# Patient Record
Sex: MALE | Race: BLACK OR AFRICAN AMERICAN | NOT HISPANIC OR LATINO | Employment: FULL TIME | URBAN - METROPOLITAN AREA
[De-identification: names, ages, dates, MRNs, and addresses within clinical notes are randomized per-mention and may not be internally consistent; named-entity substitution may affect disease eponyms.]

---

## 2018-04-05 ENCOUNTER — OFFICE VISIT (OUTPATIENT)
Dept: FAMILY MEDICINE CLINIC | Facility: CLINIC | Age: 58
End: 2018-04-05

## 2018-04-05 VITALS
TEMPERATURE: 98.3 F | WEIGHT: 237 LBS | OXYGEN SATURATION: 98 % | HEIGHT: 74 IN | BODY MASS INDEX: 30.42 KG/M2 | SYSTOLIC BLOOD PRESSURE: 132 MMHG | HEART RATE: 73 BPM | DIASTOLIC BLOOD PRESSURE: 84 MMHG

## 2018-04-05 DIAGNOSIS — H11.003 PTERYGIUM EYE, BILATERAL: ICD-10-CM

## 2018-04-05 DIAGNOSIS — H52.4 PRESBYOPIA OF BOTH EYES: Primary | ICD-10-CM

## 2018-04-05 DIAGNOSIS — H53.40 VISUAL FIELD DEFECT: ICD-10-CM

## 2018-04-05 PROCEDURE — 99213 OFFICE O/P EST LOW 20 MIN: CPT | Performed by: FAMILY MEDICINE

## 2018-04-05 RX ORDER — AMLODIPINE BESYLATE 10 MG/1
TABLET ORAL
COMMUNITY
Start: 2018-02-20 | End: 2019-02-05 | Stop reason: SDUPTHER

## 2018-04-05 RX ORDER — LISINOPRIL AND HYDROCHLOROTHIAZIDE 25; 20 MG/1; MG/1
TABLET ORAL
COMMUNITY
Start: 2018-02-20 | End: 2019-02-05 | Stop reason: SDUPTHER

## 2018-04-05 NOTE — PROGRESS NOTES
"Subjective   Aron Driscoll is a 57 y.o. male.     Eye Problem    Both eyes are affected.The current episode started more than 1 month ago. The problem has been gradually worsening. There was no injury mechanism. Associated symptoms include blurred vision (left eye) and eye redness. He has tried nothing for the symptoms.        The following portions of the patient's history were reviewed and updated as appropriate: allergies, current medications, past social history and problem list.    Review of Systems   Eyes: Positive for blurred vision (left eye), redness and visual disturbance.        Presbyopia requiring readers.       Objective   /84 (BP Location: Right arm, Patient Position: Sitting, Cuff Size: Adult)   Pulse 73   Temp 98.3 °F (36.8 °C) (Oral)   Ht 188 cm (74\")   Wt 108 kg (237 lb)   SpO2 98%   BMI 30.43 kg/m²   Physical Exam   Constitutional: He appears well-developed and well-nourished.   HENT:   Mouth/Throat: Oropharynx is clear and moist.   Eyes:   The patient appears to have pterygia developing  at the medial aspect of each eye.  On the right side this is more prominent and does enter into the outer aspect of the iris.  On the left side it is less developed.   Nursing note and vitals reviewed.      Assessment/Plan   Problem List Items Addressed This Visit     None      Visit Diagnoses     Presbyopia of both eyes    -  Primary    Visual field defect        left eye      Pterygium eye, bilateral                  Drink plenty fluids.    Continue medications as doing.    Refer to Ophthalmology Dr Abbott.    Follow up as needed.                Scribed for Dr Gurpreet Mackenzie by Shaina Brock CMA.          I, Gurpreet Mackenzie MD, personally performed the services described in this documentation, as scribed by Shaina Brock in my presence, and is both accurate and complete.      "

## 2019-02-05 ENCOUNTER — OFFICE VISIT (OUTPATIENT)
Dept: FAMILY MEDICINE CLINIC | Facility: CLINIC | Age: 59
End: 2019-02-05

## 2019-02-05 VITALS
OXYGEN SATURATION: 98 % | WEIGHT: 226.4 LBS | SYSTOLIC BLOOD PRESSURE: 118 MMHG | TEMPERATURE: 97.9 F | HEIGHT: 76 IN | HEART RATE: 66 BPM | RESPIRATION RATE: 16 BRPM | BODY MASS INDEX: 27.57 KG/M2 | DIASTOLIC BLOOD PRESSURE: 82 MMHG

## 2019-02-05 DIAGNOSIS — N52.02 CORPORO-VENOUS OCCLUSIVE ERECTILE DYSFUNCTION: ICD-10-CM

## 2019-02-05 DIAGNOSIS — Z00.00 HEALTHCARE MAINTENANCE: ICD-10-CM

## 2019-02-05 DIAGNOSIS — I10 ESSENTIAL HYPERTENSION: ICD-10-CM

## 2019-02-05 LAB
ALBUMIN SERPL-MCNC: 4.66 G/DL (ref 3.2–4.8)
ALBUMIN/GLOB SERPL: 2.1 G/DL (ref 1.5–2.5)
ALP SERPL-CCNC: 53 U/L (ref 25–100)
ALT SERPL W P-5'-P-CCNC: 20 U/L (ref 7–40)
ANION GAP SERPL CALCULATED.3IONS-SCNC: 4 MMOL/L (ref 3–11)
ARTICHOKE IGE QN: 136 MG/DL (ref 0–130)
AST SERPL-CCNC: 21 U/L (ref 0–33)
BASOPHILS # BLD AUTO: 0.02 10*3/MM3 (ref 0–0.2)
BASOPHILS NFR BLD AUTO: 0.5 % (ref 0–1)
BILIRUB BLD-MCNC: NEGATIVE MG/DL
BILIRUB SERPL-MCNC: 0.6 MG/DL (ref 0.3–1.2)
BUN BLD-MCNC: 22 MG/DL (ref 9–23)
BUN/CREAT SERPL: 15.7 (ref 7–25)
CALCIUM SPEC-SCNC: 9.7 MG/DL (ref 8.7–10.4)
CHLORIDE SERPL-SCNC: 104 MMOL/L (ref 99–109)
CHOLEST SERPL-MCNC: 177 MG/DL (ref 0–200)
CLARITY, POC: CLEAR
CO2 SERPL-SCNC: 32 MMOL/L (ref 20–31)
COLOR UR: YELLOW
CREAT BLD-MCNC: 1.4 MG/DL (ref 0.6–1.3)
CRP SERPL-MCNC: 0.04 MG/DL (ref 0–1)
DEPRECATED RDW RBC AUTO: 44.5 FL (ref 37–54)
EOSINOPHIL # BLD AUTO: 0.06 10*3/MM3 (ref 0–0.3)
EOSINOPHIL NFR BLD AUTO: 1.5 % (ref 0–3)
ERYTHROCYTE [DISTWIDTH] IN BLOOD BY AUTOMATED COUNT: 14 % (ref 11.3–14.5)
GFR SERPL CREATININE-BSD FRML MDRD: 63 ML/MIN/1.73
GLOBULIN UR ELPH-MCNC: 2.2 GM/DL
GLUCOSE BLD-MCNC: 78 MG/DL (ref 70–100)
GLUCOSE UR STRIP-MCNC: NEGATIVE MG/DL
HBA1C MFR BLD: 5.4 % (ref 4.8–5.6)
HCT VFR BLD AUTO: 44.3 % (ref 38.9–50.9)
HCV AB SER DONR QL: NORMAL
HDLC SERPL-MCNC: 31 MG/DL (ref 40–60)
HGB BLD-MCNC: 14.3 G/DL (ref 13.1–17.5)
HIV1+2 AB SER QL: NORMAL
IMM GRANULOCYTES # BLD AUTO: 0 10*3/MM3 (ref 0–0.03)
IMM GRANULOCYTES NFR BLD AUTO: 0 % (ref 0–0.6)
KETONES UR QL: NEGATIVE
LEUKOCYTE EST, POC: NEGATIVE
LYMPHOCYTES # BLD AUTO: 1.92 10*3/MM3 (ref 0.6–4.8)
LYMPHOCYTES NFR BLD AUTO: 46.7 % (ref 24–44)
MCH RBC QN AUTO: 27.9 PG (ref 27–31)
MCHC RBC AUTO-ENTMCNC: 32.3 G/DL (ref 32–36)
MCV RBC AUTO: 86.5 FL (ref 80–99)
MONOCYTES # BLD AUTO: 0.35 10*3/MM3 (ref 0–1)
MONOCYTES NFR BLD AUTO: 8.5 % (ref 0–12)
NEUTROPHILS # BLD AUTO: 1.76 10*3/MM3 (ref 1.5–8.3)
NEUTROPHILS NFR BLD AUTO: 42.8 % (ref 41–71)
NITRITE UR-MCNC: NEGATIVE MG/ML
PH UR: 6 [PH] (ref 5–8)
PLATELET # BLD AUTO: 227 10*3/MM3 (ref 150–450)
PMV BLD AUTO: 10 FL (ref 6–12)
POTASSIUM BLD-SCNC: 4.2 MMOL/L (ref 3.5–5.5)
PROT SERPL-MCNC: 6.9 G/DL (ref 5.7–8.2)
PROT UR STRIP-MCNC: NEGATIVE MG/DL
PSA SERPL-MCNC: 3.88 NG/ML (ref 0–4)
RBC # BLD AUTO: 5.12 10*6/MM3 (ref 4.2–5.76)
RBC # UR STRIP: NEGATIVE /UL
SODIUM BLD-SCNC: 140 MMOL/L (ref 132–146)
SP GR UR: 1.02 (ref 1–1.03)
TRIGL SERPL-MCNC: 98 MG/DL (ref 0–150)
TSH SERPL DL<=0.05 MIU/L-ACNC: 1.76 MIU/ML (ref 0.35–5.35)
URATE SERPL-MCNC: 8 MG/DL (ref 3.7–9.2)
UROBILINOGEN UR QL: NORMAL
WBC NRBC COR # BLD: 4.11 10*3/MM3 (ref 3.5–10.8)

## 2019-02-05 PROCEDURE — 83036 HEMOGLOBIN GLYCOSYLATED A1C: CPT | Performed by: FAMILY MEDICINE

## 2019-02-05 PROCEDURE — 99396 PREV VISIT EST AGE 40-64: CPT | Performed by: FAMILY MEDICINE

## 2019-02-05 PROCEDURE — 93000 ELECTROCARDIOGRAM COMPLETE: CPT | Performed by: FAMILY MEDICINE

## 2019-02-05 PROCEDURE — G0432 EIA HIV-1/HIV-2 SCREEN: HCPCS | Performed by: FAMILY MEDICINE

## 2019-02-05 PROCEDURE — 86140 C-REACTIVE PROTEIN: CPT | Performed by: FAMILY MEDICINE

## 2019-02-05 PROCEDURE — 84443 ASSAY THYROID STIM HORMONE: CPT | Performed by: FAMILY MEDICINE

## 2019-02-05 PROCEDURE — 84550 ASSAY OF BLOOD/URIC ACID: CPT | Performed by: FAMILY MEDICINE

## 2019-02-05 PROCEDURE — 90471 IMMUNIZATION ADMIN: CPT | Performed by: FAMILY MEDICINE

## 2019-02-05 PROCEDURE — 90715 TDAP VACCINE 7 YRS/> IM: CPT | Performed by: FAMILY MEDICINE

## 2019-02-05 PROCEDURE — 80061 LIPID PANEL: CPT | Performed by: FAMILY MEDICINE

## 2019-02-05 PROCEDURE — 86803 HEPATITIS C AB TEST: CPT | Performed by: FAMILY MEDICINE

## 2019-02-05 PROCEDURE — G0103 PSA SCREENING: HCPCS | Performed by: FAMILY MEDICINE

## 2019-02-05 PROCEDURE — 85025 COMPLETE CBC W/AUTO DIFF WBC: CPT | Performed by: FAMILY MEDICINE

## 2019-02-05 PROCEDURE — 80053 COMPREHEN METABOLIC PANEL: CPT | Performed by: FAMILY MEDICINE

## 2019-02-05 RX ORDER — SILDENAFIL 100 MG/1
100 TABLET, FILM COATED ORAL DAILY PRN
Qty: 9 TABLET | Refills: 11 | Status: SHIPPED | OUTPATIENT
Start: 2019-02-05 | End: 2020-02-13 | Stop reason: SDUPTHER

## 2019-02-05 RX ORDER — LISINOPRIL AND HYDROCHLOROTHIAZIDE 25; 20 MG/1; MG/1
1 TABLET ORAL DAILY
Qty: 90 TABLET | Refills: 3 | Status: SHIPPED | OUTPATIENT
Start: 2019-02-05 | End: 2019-02-21 | Stop reason: SDUPTHER

## 2019-02-05 RX ORDER — LATANOPROST 50 UG/ML
1 SOLUTION/ DROPS OPHTHALMIC
Refills: 0 | COMMUNITY
Start: 2019-01-14 | End: 2021-03-10 | Stop reason: SDUPTHER

## 2019-02-05 RX ORDER — DORZOLAMIDE HYDROCHLORIDE AND TIMOLOL MALEATE 20; 5 MG/ML; MG/ML
SOLUTION/ DROPS OPHTHALMIC
Refills: 1 | COMMUNITY
Start: 2018-11-21 | End: 2021-03-10 | Stop reason: SDUPTHER

## 2019-02-05 RX ORDER — AMLODIPINE BESYLATE 10 MG/1
10 TABLET ORAL DAILY
Qty: 90 TABLET | Refills: 3 | Status: SHIPPED | OUTPATIENT
Start: 2019-02-05 | End: 2019-02-21 | Stop reason: SDUPTHER

## 2019-02-05 NOTE — PROGRESS NOTES
Subjective   Aron Driscoll is a 58 y.o. male.     History of Present Illness   He is here for his annual fasting wellness evaluation.  He is amendable to updating his immunizations.  He is needing a refill of his blood pressure medications.  He is tolerating them well with no adverse events.  He describes good blood pressure control.  He is also requesting a refill of his phosphodiesterase inhibitor.    Review of Systems   Constitutional: Negative.    HENT: Negative.    Eyes: Negative.    Respiratory: Negative.    Cardiovascular: Negative.    Gastrointestinal: Negative.    Endocrine: Negative.    Genitourinary: Negative.    Musculoskeletal: Negative.    Skin: Negative.    Allergic/Immunologic: Negative.    Neurological: Negative.    Hematological: Negative.    Psychiatric/Behavioral: Negative.        Objective   Physical Exam   Constitutional: He is oriented to person, place, and time. He appears well-developed and well-nourished. He is cooperative.   HENT:   Head: Normocephalic and atraumatic.   Right Ear: Hearing and external ear normal.   Left Ear: Hearing and external ear normal.   Nose: Nose normal.   Mouth/Throat: Uvula is midline, oropharynx is clear and moist and mucous membranes are normal.   Eyes: Conjunctivae and EOM are normal. Pupils are equal, round, and reactive to light. No scleral icterus.   Neck: Trachea normal and normal range of motion. Neck supple. No JVD present. Carotid bruit is not present. No thyromegaly present.   Cardiovascular: Normal rate, regular rhythm, normal heart sounds and intact distal pulses.   Pulmonary/Chest: Effort normal and breath sounds normal.   Abdominal: Soft. Bowel sounds are normal. There is no hepatosplenomegaly. There is no tenderness.   Musculoskeletal: Normal range of motion.   Lymphadenopathy:     He has no cervical adenopathy.   Neurological: He is alert and oriented to person, place, and time. He has normal strength and normal reflexes. No sensory deficit. Gait  normal.   Skin: Skin is warm and dry.   Psychiatric: He has a normal mood and affect. His speech is normal and behavior is normal. Judgment and thought content normal. Cognition and memory are normal.   Nursing note and vitals reviewed.      ECG 12 Lead  Date/Time: 2/5/2019 8:43 AM  Performed by: Juan Alberto Ponce MD  Authorized by: Juan Alberto Ponce MD   Comparison: not compared with previous ECG   Previous ECG: no previous ECG available  Rhythm: sinus rhythm  Rate: normal  BPM: 62  Conduction: 1st degree  ST Segments: ST segments normal  T Waves: T waves normal  QRS axis: normal  Clinical impression: abnormal ECG            Assessment/Plan   Diagnoses and all orders for this visit:    Healthcare maintenance  -     POC Urinalysis Dipstick, Automated  -     Comprehensive Metabolic Panel  -     CBC & Differential  -     Lipid Panel  -     TSH  -     Uric Acid  -     C-reactive Protein  -     PSA Screen  -     HIV-1 / O / 2 Ag / Antibody 4th Generation  -     Hepatitis C Antibody  -     Hemoglobin A1c  -     Tdap Vaccine IM  - Vaccine counseling and current VIS is provided for immunizations administered today.    Essential hypertension  -     ECG 12 Lead  -     POC Urinalysis Dipstick, Automated  -     Comprehensive Metabolic Panel  -     lisinopril-hydrochlorothiazide (PRINZIDE,ZESTORETIC) 20-25 MG per tablet; Take 1 tablet by mouth Daily.  -     amLODIPine (NORVASC) 10 MG tablet; Take 1 tablet by mouth Daily.  - Healthy heart diet  - Daily aerobic exercise  - Routine blood pressure monitoring  - [place] Heart Disease and Stroke Prevention Task Force pamphlet reviewed and administered.    Corporo-venous occlusive erectile dysfunction  -     sildenafil (VIAGRA) 100 MG tablet; Take 1 tablet by mouth Daily As Needed for erectile dysfunction.  - Avoid excessive alcohol  - Sleep / wake hygiene  - Daily aerobic exercise    The patient is here for a health maintenance visit.  Currently, the patient consumes a healthy diet and  has an adequate exercise regimen. Screening lab work is ordered.  Immunizations are updated today.  Advice and education is given regarding nutrition, aerobic exercise, routine dental evaluations, routine eye exams, reproductive health, cardiovascular risk reduction, sunscreen use, self skin examination (annual dermatology evaluations) and seat belt use (general overall safety).  Further recommendations after lab evaluation.  Annual wellness evaluations recommended.

## 2019-02-19 ENCOUNTER — TELEPHONE (OUTPATIENT)
Dept: FAMILY MEDICINE CLINIC | Facility: CLINIC | Age: 59
End: 2019-02-19
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
## 2019-02-21 DIAGNOSIS — I10 ESSENTIAL HYPERTENSION: ICD-10-CM

## 2019-02-21 RX ORDER — LISINOPRIL AND HYDROCHLOROTHIAZIDE 25; 20 MG/1; MG/1
1 TABLET ORAL DAILY
Qty: 90 TABLET | Refills: 0 | Status: SHIPPED | OUTPATIENT
Start: 2019-02-21 | End: 2019-05-24 | Stop reason: SDUPTHER

## 2019-02-21 RX ORDER — AMLODIPINE BESYLATE 10 MG/1
10 TABLET ORAL DAILY
Qty: 90 TABLET | Refills: 0 | Status: SHIPPED | OUTPATIENT
Start: 2019-02-21 | End: 2019-05-24 | Stop reason: SDUPTHER

## 2019-05-24 ENCOUNTER — TELEPHONE (OUTPATIENT)
Dept: FAMILY MEDICINE CLINIC | Facility: CLINIC | Age: 59
End: 2019-05-24

## 2019-05-24 DIAGNOSIS — I10 ESSENTIAL HYPERTENSION: ICD-10-CM

## 2019-05-24 RX ORDER — AMLODIPINE BESYLATE 10 MG/1
10 TABLET ORAL DAILY
Qty: 90 TABLET | Refills: 3 | Status: SHIPPED | OUTPATIENT
Start: 2019-05-24 | End: 2020-02-13 | Stop reason: SDUPTHER

## 2019-05-24 RX ORDER — LISINOPRIL AND HYDROCHLOROTHIAZIDE 25; 20 MG/1; MG/1
1 TABLET ORAL DAILY
Qty: 90 TABLET | Refills: 3 | Status: SHIPPED | OUTPATIENT
Start: 2019-05-24 | End: 2020-02-13 | Stop reason: SDUPTHER

## 2019-05-24 NOTE — TELEPHONE ENCOUNTER
----- Message from Randell Plummer sent at 5/24/2019 10:08 AM EDT -----  Regarding: LISENOPRIL AND AMLODINE  Contact: [phone]  PT NEEDS REFILLS ON ABOVE MEDICATIONS

## 2019-05-28 ENCOUNTER — OFFICE VISIT (OUTPATIENT)
Dept: FAMILY MEDICINE CLINIC | Facility: CLINIC | Age: 59
End: 2019-05-28

## 2019-05-28 VITALS
SYSTOLIC BLOOD PRESSURE: 120 MMHG | DIASTOLIC BLOOD PRESSURE: 76 MMHG | RESPIRATION RATE: 16 BRPM | HEART RATE: 70 BPM | BODY MASS INDEX: 27.84 KG/M2 | WEIGHT: 228.6 LBS | OXYGEN SATURATION: 98 % | HEIGHT: 76 IN

## 2019-05-28 DIAGNOSIS — N18.30 CKD (CHRONIC KIDNEY DISEASE) STAGE 3, GFR 30-59 ML/MIN (HCC): Primary | ICD-10-CM

## 2019-05-28 DIAGNOSIS — I10 ESSENTIAL HYPERTENSION: ICD-10-CM

## 2019-05-28 DIAGNOSIS — E78.01 FAMILIAL HYPERCHOLESTEROLEMIA: ICD-10-CM

## 2019-05-28 LAB
ANION GAP SERPL CALCULATED.3IONS-SCNC: 12.3 MMOL/L
BUN BLD-MCNC: 18 MG/DL (ref 6–20)
BUN/CREAT SERPL: 14.3 (ref 7–25)
CALCIUM SPEC-SCNC: 9.3 MG/DL (ref 8.6–10.5)
CHLORIDE SERPL-SCNC: 101 MMOL/L (ref 98–107)
CO2 SERPL-SCNC: 27.7 MMOL/L (ref 22–29)
CREAT BLD-MCNC: 1.26 MG/DL (ref 0.76–1.27)
GFR SERPL CREATININE-BSD FRML MDRD: 71 ML/MIN/1.73
GLUCOSE BLD-MCNC: 76 MG/DL (ref 65–99)
POTASSIUM BLD-SCNC: 4.1 MMOL/L (ref 3.5–5.2)
SODIUM BLD-SCNC: 141 MMOL/L (ref 136–145)

## 2019-05-28 PROCEDURE — 99214 OFFICE O/P EST MOD 30 MIN: CPT | Performed by: FAMILY MEDICINE

## 2019-05-28 PROCEDURE — 80048 BASIC METABOLIC PNL TOTAL CA: CPT | Performed by: FAMILY MEDICINE

## 2019-05-28 RX ORDER — ROSUVASTATIN CALCIUM 20 MG/1
20 TABLET, COATED ORAL DAILY
Qty: 90 TABLET | Refills: 3 | Status: SHIPPED | OUTPATIENT
Start: 2019-05-28 | End: 2019-05-30 | Stop reason: SDUPTHER

## 2019-05-28 NOTE — PROGRESS NOTES
"Max Driscoll is a 58 y.o. male.     History of Present Illness   He is here to follow up on his elevated creatinine (CKD3), HLD and HTN.  He reports tolerating all of his medications well with no adverse events.  He describes improved blood pressure control.  He has instituted a low cholesterol diet.  He reports routine aerobic activity.  He describes avoidance of routine NSAID use and increased daily hydration.  He voices no acute symptoms.  He is anticipating repeat lab evaluation.    Medical History  I have reviewed and updated the following portions of the patient's history: allergies, current medications, past medical history, past surgical history, past family history, and past social history.    Review of Systems   Constitutional: Negative for chills and fever.   Respiratory: Negative for shortness of breath.    Cardiovascular: Negative for chest pain and leg swelling.   Genitourinary: Negative for difficulty urinating, dysuria, flank pain and hematuria.   Hematological: Does not bruise/bleed easily.       Objective   /76   Pulse 70   Resp 16   Ht 191.8 cm (75.5\")   Wt 104 kg (228 lb 9.6 oz)   SpO2 98%   BMI 28.20 kg/m²     Physical Exam   Constitutional: He is oriented to person, place, and time. He appears well-developed and well-nourished.   HENT:   Head: Normocephalic.   Mouth/Throat: Oropharynx is clear and moist.   Eyes: Conjunctivae and EOM are normal. Pupils are equal, round, and reactive to light.   Neck: Neck supple. No thyromegaly present.   Cardiovascular: Normal rate, regular rhythm and normal heart sounds.   Pulmonary/Chest: Effort normal and breath sounds normal.   Abdominal: Soft. Bowel sounds are normal. There is no tenderness. There is no CVA tenderness.   Musculoskeletal: Normal range of motion.   Neurological: He is alert and oriented to person, place, and time. Coordination normal.   Skin: Skin is warm and dry. Capillary refill takes less than 2 seconds. "   Psychiatric: He has a normal mood and affect. His behavior is normal.   Nursing note and vitals reviewed.      Assessment/Plan   Diagnoses and all orders for this visit:    CKD (chronic kidney disease) stage 3, GFR 30-59 ml/min (CMS/[facility])  -     Basic Metabolic Panel  -     US Renal Bilateral ordered  -     Ambulatory Referral to Nephrology  - We discussed his chronic history of hypertension  - We discussed the importance of good blood pressure control  - Avoid nephrotoxic medications including routine use of NSAIDS  - Hydrate well daily    Familial hypercholesterolemia  -     rosuvastatin (CRESTOR) 20 MG tablet; Take 1 tablet by mouth Daily. #90 c 3RF  - Low cholesterol diet (< 200 mg / day)  - Daily aerobic exercise  - Aspirin 81 mg po once daily    Essential hypertension        - Amlodipine 10 mg po once daily        - Lisinopril/HCTZ 20-25 mg po once daily  - Healthy heart diet  - Low dose aspirin once daily  - Daily aerobic exercise  - Routine blood pressure monitoring  - [place] Heart Disease and Stroke Prevention Task Force pamphlet reviewed and administered.    RTC after nephrology evaluation

## 2019-05-30 DIAGNOSIS — E78.01 FAMILIAL HYPERCHOLESTEROLEMIA: ICD-10-CM

## 2019-05-30 RX ORDER — ROSUVASTATIN CALCIUM 20 MG/1
20 TABLET, COATED ORAL DAILY
Qty: 30 TABLET | Refills: 11 | Status: SHIPPED | OUTPATIENT
Start: 2019-05-30 | End: 2019-06-03 | Stop reason: ALTCHOICE

## 2019-06-03 ENCOUNTER — HOSPITAL ENCOUNTER (OUTPATIENT)
Dept: ULTRASOUND IMAGING | Facility: HOSPITAL | Age: 59
Discharge: HOME | End: 2019-06-03
Admitting: FAMILY MEDICINE

## 2019-06-03 DIAGNOSIS — E78.01 FAMILIAL HYPERCHOLESTEROLEMIA: Primary | ICD-10-CM

## 2019-06-03 DIAGNOSIS — N18.30 CKD (CHRONIC KIDNEY DISEASE) STAGE 3, GFR 30-59 ML/MIN (HCC): ICD-10-CM

## 2019-06-03 PROCEDURE — 76775 US EXAM ABDO BACK WALL LIM: CPT

## 2019-06-03 RX ORDER — ATORVASTATIN CALCIUM 20 MG/1
20 TABLET, FILM COATED ORAL DAILY
Qty: 90 TABLET | Refills: 2 | Status: SHIPPED | OUTPATIENT
Start: 2019-06-03 | End: 2020-02-13 | Stop reason: SDUPTHER

## 2020-02-13 ENCOUNTER — OFFICE VISIT (OUTPATIENT)
Dept: FAMILY MEDICINE CLINIC | Facility: CLINIC | Age: 60
End: 2020-02-13

## 2020-02-13 VITALS
SYSTOLIC BLOOD PRESSURE: 112 MMHG | OXYGEN SATURATION: 97 % | DIASTOLIC BLOOD PRESSURE: 72 MMHG | HEART RATE: 70 BPM | RESPIRATION RATE: 18 BRPM | BODY MASS INDEX: 27.28 KG/M2 | TEMPERATURE: 97.3 F | WEIGHT: 224 LBS | HEIGHT: 76 IN

## 2020-02-13 DIAGNOSIS — E78.01 FAMILIAL HYPERCHOLESTEROLEMIA: ICD-10-CM

## 2020-02-13 DIAGNOSIS — R94.31 ABNORMAL ECG: ICD-10-CM

## 2020-02-13 DIAGNOSIS — N18.30 CKD (CHRONIC KIDNEY DISEASE) STAGE 3, GFR 30-59 ML/MIN (HCC): ICD-10-CM

## 2020-02-13 DIAGNOSIS — I10 ESSENTIAL HYPERTENSION: ICD-10-CM

## 2020-02-13 DIAGNOSIS — Z12.5 SCREENING FOR MALIGNANT NEOPLASM OF PROSTATE: ICD-10-CM

## 2020-02-13 DIAGNOSIS — Z00.00 ANNUAL PHYSICAL EXAM: Primary | ICD-10-CM

## 2020-02-13 DIAGNOSIS — Z23 NEED FOR INFLUENZA VACCINATION: ICD-10-CM

## 2020-02-13 DIAGNOSIS — N52.02 CORPORO-VENOUS OCCLUSIVE ERECTILE DYSFUNCTION: ICD-10-CM

## 2020-02-13 PROBLEM — E78.5 HLD (HYPERLIPIDEMIA): Status: ACTIVE | Noted: 2020-02-13

## 2020-02-13 PROBLEM — H40.9 GLAUCOMA: Status: ACTIVE | Noted: 2020-02-13

## 2020-02-13 LAB
ALBUMIN SERPL-MCNC: 4.5 G/DL (ref 3.5–5.2)
ALBUMIN/GLOB SERPL: 1.7 G/DL
ALP SERPL-CCNC: 60 U/L (ref 39–117)
ALT SERPL W P-5'-P-CCNC: 20 U/L (ref 1–41)
ANION GAP SERPL CALCULATED.3IONS-SCNC: 10.7 MMOL/L (ref 5–15)
AST SERPL-CCNC: 17 U/L (ref 1–40)
BASOPHILS # BLD AUTO: 0.03 10*3/MM3 (ref 0–0.2)
BASOPHILS NFR BLD AUTO: 0.8 % (ref 0–1.5)
BILIRUB BLD-MCNC: NEGATIVE MG/DL
BILIRUB SERPL-MCNC: 0.3 MG/DL (ref 0.2–1.2)
BUN BLD-MCNC: 15 MG/DL (ref 6–20)
BUN/CREAT SERPL: 12.3 (ref 7–25)
CALCIUM SPEC-SCNC: 9.6 MG/DL (ref 8.6–10.5)
CHLORIDE SERPL-SCNC: 98 MMOL/L (ref 98–107)
CHOLEST SERPL-MCNC: 112 MG/DL (ref 0–200)
CLARITY, POC: CLEAR
CO2 SERPL-SCNC: 29.3 MMOL/L (ref 22–29)
COLOR UR: YELLOW
CREAT BLD-MCNC: 1.22 MG/DL (ref 0.76–1.27)
DEPRECATED RDW RBC AUTO: 42.3 FL (ref 37–54)
EOSINOPHIL # BLD AUTO: 0.07 10*3/MM3 (ref 0–0.4)
EOSINOPHIL NFR BLD AUTO: 1.8 % (ref 0.3–6.2)
ERYTHROCYTE [DISTWIDTH] IN BLOOD BY AUTOMATED COUNT: 13.2 % (ref 12.3–15.4)
GFR SERPL CREATININE-BSD FRML MDRD: 74 ML/MIN/1.73
GLOBULIN UR ELPH-MCNC: 2.7 GM/DL
GLUCOSE BLD-MCNC: 86 MG/DL (ref 65–99)
GLUCOSE UR STRIP-MCNC: NEGATIVE MG/DL
HCT VFR BLD AUTO: 44.6 % (ref 37.5–51)
HDLC SERPL-MCNC: 31 MG/DL (ref 40–60)
HGB BLD-MCNC: 14.5 G/DL (ref 13–17.7)
IMM GRANULOCYTES # BLD AUTO: 0.01 10*3/MM3 (ref 0–0.05)
IMM GRANULOCYTES NFR BLD AUTO: 0.3 % (ref 0–0.5)
KETONES UR QL: NEGATIVE
LDLC SERPL CALC-MCNC: 69 MG/DL (ref 0–100)
LDLC/HDLC SERPL: 2.22 {RATIO}
LEUKOCYTE EST, POC: NEGATIVE
LYMPHOCYTES # BLD AUTO: 1.91 10*3/MM3 (ref 0.7–3.1)
LYMPHOCYTES NFR BLD AUTO: 48.1 % (ref 19.6–45.3)
MCH RBC QN AUTO: 28.3 PG (ref 26.6–33)
MCHC RBC AUTO-ENTMCNC: 32.5 G/DL (ref 31.5–35.7)
MCV RBC AUTO: 87.1 FL (ref 79–97)
MONOCYTES # BLD AUTO: 0.42 10*3/MM3 (ref 0.1–0.9)
MONOCYTES NFR BLD AUTO: 10.6 % (ref 5–12)
NEUTROPHILS # BLD AUTO: 1.53 10*3/MM3 (ref 1.7–7)
NEUTROPHILS NFR BLD AUTO: 38.4 % (ref 42.7–76)
NITRITE UR-MCNC: NEGATIVE MG/ML
NRBC BLD AUTO-RTO: 0 /100 WBC (ref 0–0.2)
PH UR: 5.5 [PH] (ref 5–8)
PLATELET # BLD AUTO: 210 10*3/MM3 (ref 140–450)
PMV BLD AUTO: 10.1 FL (ref 6–12)
POTASSIUM BLD-SCNC: 3.8 MMOL/L (ref 3.5–5.2)
PROT SERPL-MCNC: 7.2 G/DL (ref 6–8.5)
PROT UR STRIP-MCNC: NEGATIVE MG/DL
PSA SERPL-MCNC: 5.13 NG/ML (ref 0–4)
RBC # BLD AUTO: 5.12 10*6/MM3 (ref 4.14–5.8)
RBC # UR STRIP: NEGATIVE /UL
SODIUM BLD-SCNC: 138 MMOL/L (ref 136–145)
SP GR UR: 1.02 (ref 1–1.03)
TRIGL SERPL-MCNC: 61 MG/DL (ref 0–150)
TSH SERPL DL<=0.05 MIU/L-ACNC: 3.09 UIU/ML (ref 0.27–4.2)
UROBILINOGEN UR QL: NORMAL
VLDLC SERPL-MCNC: 12.2 MG/DL (ref 5–40)
WBC NRBC COR # BLD: 3.97 10*3/MM3 (ref 3.4–10.8)

## 2020-02-13 PROCEDURE — 81003 URINALYSIS AUTO W/O SCOPE: CPT | Performed by: NURSE PRACTITIONER

## 2020-02-13 PROCEDURE — 80061 LIPID PANEL: CPT | Performed by: NURSE PRACTITIONER

## 2020-02-13 PROCEDURE — 99396 PREV VISIT EST AGE 40-64: CPT | Performed by: NURSE PRACTITIONER

## 2020-02-13 PROCEDURE — 90686 IIV4 VACC NO PRSV 0.5 ML IM: CPT | Performed by: NURSE PRACTITIONER

## 2020-02-13 PROCEDURE — 84443 ASSAY THYROID STIM HORMONE: CPT | Performed by: NURSE PRACTITIONER

## 2020-02-13 PROCEDURE — 80053 COMPREHEN METABOLIC PANEL: CPT | Performed by: NURSE PRACTITIONER

## 2020-02-13 PROCEDURE — 90471 IMMUNIZATION ADMIN: CPT | Performed by: NURSE PRACTITIONER

## 2020-02-13 PROCEDURE — 93000 ELECTROCARDIOGRAM COMPLETE: CPT | Performed by: NURSE PRACTITIONER

## 2020-02-13 PROCEDURE — 85025 COMPLETE CBC W/AUTO DIFF WBC: CPT | Performed by: NURSE PRACTITIONER

## 2020-02-13 PROCEDURE — G0103 PSA SCREENING: HCPCS | Performed by: NURSE PRACTITIONER

## 2020-02-13 RX ORDER — ATORVASTATIN CALCIUM 20 MG/1
20 TABLET, FILM COATED ORAL DAILY
Qty: 90 TABLET | Refills: 2 | Status: SHIPPED | OUTPATIENT
Start: 2020-02-13 | End: 2021-03-10 | Stop reason: SDUPTHER

## 2020-02-13 RX ORDER — SILDENAFIL 100 MG/1
100 TABLET, FILM COATED ORAL DAILY PRN
Qty: 9 TABLET | Refills: 11 | Status: SHIPPED | OUTPATIENT
Start: 2020-02-13 | End: 2021-03-10 | Stop reason: SDUPTHER

## 2020-02-13 RX ORDER — LISINOPRIL AND HYDROCHLOROTHIAZIDE 25; 20 MG/1; MG/1
1 TABLET ORAL DAILY
Qty: 90 TABLET | Refills: 3 | Status: SHIPPED | OUTPATIENT
Start: 2020-02-13 | End: 2021-03-10 | Stop reason: SDUPTHER

## 2020-02-13 RX ORDER — AMLODIPINE BESYLATE 10 MG/1
10 TABLET ORAL DAILY
Qty: 90 TABLET | Refills: 3 | Status: SHIPPED | OUTPATIENT
Start: 2020-02-13 | End: 2021-03-10 | Stop reason: SDUPTHER

## 2020-02-13 NOTE — PROGRESS NOTES
Subjective   Aron Driscoll is a 59 y.o. male.     Mr. Driscoll presents today for annual physical exam. Patient describes his overall health as good. He continues to be followed by nephrology for CKD stage 3 and by ophthalmology for management of his glaucoma. He HTN and HLD have been stable and controlled on current medications. He is fasting today and is amenable for routine screening lab work.      The following portions of the patient's history were reviewed and updated as appropriate: allergies, current medications, past family history, past medical history, past social history, past surgical history and problem list.    Allergies as of 02/13/2020   • (No Known Allergies)       Current Outpatient Medications on File Prior to Visit   Medication Sig   • dorzolamide-timolol (COSOPT) 22.3-6.8 MG/ML ophthalmic solution instill 1 drop into both eyes twice a day   • latanoprost (XALATAN) 0.005 % ophthalmic solution Administer 1 drop to both eyes every night at bedtime.   • [DISCONTINUED] amLODIPine (NORVASC) 10 MG tablet Take 1 tablet by mouth Daily.   • [DISCONTINUED] atorvastatin (LIPITOR) 20 MG tablet Take 1 tablet by mouth Daily.   • [DISCONTINUED] lisinopril-hydrochlorothiazide (PRINZIDE,ZESTORETIC) 20-25 MG per tablet Take 1 tablet by mouth Daily.   • [DISCONTINUED] sildenafil (VIAGRA) 100 MG tablet Take 1 tablet by mouth Daily As Needed for erectile dysfunction.     No current facility-administered medications on file prior to visit.        Review of Systems   Constitutional: Negative.    HENT: Negative.    Eyes: Negative.    Respiratory: Negative.    Cardiovascular: Negative.    Gastrointestinal: Negative.    Genitourinary: Negative.         History ED   Musculoskeletal: Negative.    Neurological: Negative.        Objective   Physical Exam   Constitutional: He is oriented to person, place, and time. Vital signs are normal. He appears well-developed and well-nourished. He is cooperative. He does not appear ill.  No distress.   HENT:   Head: Normocephalic and atraumatic.   Right Ear: Tympanic membrane and external ear normal.   Left Ear: Tympanic membrane and external ear normal.   Nose: Nose normal.   Mouth/Throat: Uvula is midline, oropharynx is clear and moist and mucous membranes are normal.   Neck: Normal range of motion. Neck supple. No thyromegaly present.   Cardiovascular: Normal rate, regular rhythm and normal heart sounds.   Pulmonary/Chest: Effort normal and breath sounds normal.   Abdominal: Soft. He exhibits no distension. There is no tenderness.   Lymphadenopathy:     He has no cervical adenopathy.   Neurological: He is alert and oriented to person, place, and time.   Skin: Skin is warm, dry and intact. No rash noted. He is not diaphoretic.   Psychiatric: He has a normal mood and affect. His behavior is normal. Judgment and thought content normal.   Vitals reviewed.    Vitals:    02/13/20 0755   BP: 112/72   Pulse: 70   Resp: 18   Temp: 97.3 °F (36.3 °C)   SpO2: 97%     Body mass index is 27.63 kg/m².    Assessment/Plan   Aron was seen today for annual exam.    Diagnoses and all orders for this visit:    Annual physical exam  -     Comprehensive Metabolic Panel  -     TSH  -     CBC Auto Differential  -     Lipid Panel  -     POC Urinalysis Dipstick, Automated    Essential hypertension  -     amLODIPine (NORVASC) 10 MG tablet; Take 1 tablet by mouth Daily.  -     lisinopril-hydrochlorothiazide (PRINZIDE,ZESTORETIC) 20-25 MG per tablet; Take 1 tablet by mouth Daily.  -     Comprehensive Metabolic Panel    Familial hypercholesterolemia  -     atorvastatin (LIPITOR) 20 MG tablet; Take 1 tablet by mouth Daily.  -     Lipid Panel    Corporo-venous occlusive erectile dysfunction  -     sildenafil (VIAGRA) 100 MG tablet; Take 1 tablet by mouth Daily As Needed for Erectile Dysfunction.    CKD (chronic kidney disease) stage 3, GFR 30-59 ml/min (CMS/[facility])  -     Comprehensive Metabolic Panel    Need for influenza  vaccination  -     Flucelvax Quad=>4Years ([id number])       ECG 12 Lead  Date/Time: 2/13/2020 8:11 AM  Performed by: Julia Moreau APRN  Authorized by: Julia Moreau APRN   Comparison: compared with previous ECG from 2/5/2019  Comparison to previous ECG: Q wave in V1/V2  Rhythm: sinus rhythm  Rate: normal  BPM: 60  Conduction: 1st degree AV block  Q waves: V1 and V2    QRS axis: normal  Other: no other findings    Clinical impression: abnormal EKG            Discussed the nature of the medical condition(s) risks, complications, implications, management, safe and proper use of medications. Encouraged medication compliance, and keeping scheduled follow up appointments with me and any other providers.     The patient is here for a health maintenance visit.  Currently, the patient consumes a regular diet and has no routine exercise regimen. He states that he does not smoke or drink alcohol. Screening lab work is ordered.  Immunization for influenza is administered today and vaccine education is provided.  Advice and education is given regarding nutrition, aerobic exercise, routine dental evaluations, routine eye exams, reproductive health, cardiovascular risk reduction, sunscreen use, self skin examination (annual dermatology evaluations) and seat belt use (general overall safety).  Further recommendations after lab evaluation.  Annual wellness evaluations recommended.

## 2020-02-13 NOTE — PATIENT INSTRUCTIONS
Chronic Kidney Disease, Adult  Chronic kidney disease (CKD) occurs when the kidneys become damaged slowly over a long period of time. The kidneys are a pair of organs that do many important jobs in the body, including:  · Removing waste and extra fluid from the blood to make urine.  · Making hormones that maintain the amount of fluid in tissues and blood vessels.  · Maintaining the right amount of fluids and chemicals in the body.  A small amount of kidney damage may not cause problems, but a large amount of damage may make it hard or impossible for the kidneys to work the way they should. If steps are not taken to slow down kidney damage or to stop it from getting worse, the kidneys may stop working permanently (end-stage renal disease or ESRD). Most of the time, CKD does not go away, but it can often be controlled. People who have CKD are usually able to live normal lives.  What are the causes?  The most common causes of this condition are diabetes and high blood pressure (hypertension). Other causes include:  · Heart and blood vessel (cardiovascular) disease.  · Kidney diseases, such as:  ? Glomerulonephritis.  ? Interstitial nephritis.  ? Polycystic kidney disease.  ? Renal vascular disease.  · Diseases that affect the immune system.  · Genetic diseases.  · Medicines that damage the kidneys, such as anti-inflammatory medicines.  · Being around or being in contact with poisonous (toxic) substances.  · A kidney or urinary infection that occurs again and again (recurs).  · Vasculitis. This is swelling or inflammation of the blood vessels.  · A problem with urine flow that may be caused by:  ? Cancer.  ? Having kidney stones more than one time.  ? An enlarged prostate, in males.  What increases the risk?  You are more likely to develop this condition if you:  · Are older than age 60.  · Are female.  · Are -American, , , , or .  · Are a current or former  smoker.  · Are obese.  · Have a family history of kidney disease or failure.  · Often take medicines that are damaging to the kidneys.  What are the signs or symptoms?  Symptoms of this condition include:  · Swelling (edema) of the face, legs, ankles, or feet.  · Tiredness (lethargy) and having less energy.  · Nausea or vomiting.  · Confusion or trouble concentrating.  · Problems with urination, such as:  ? Painful or burning feeling during urination.  ? Decreased urine production.  ? Frequent urination, especially at night.  ? Bloody urine.  · Muscle twitches and cramps, especially in the legs.  · Shortness of breath.  · Weakness.  · Loss of appetite.  · Metallic taste in the mouth.  · Trouble sleeping.  · Dry, itchy skin.  · A low blood count (anemia).  · Pale lining of the eyelids and surface of the eye (conjunctiva).  Symptoms develop slowly and may not be obvious until the kidney damage becomes severe. It is possible to have kidney disease for years without having any symptoms.  How is this diagnosed?  This condition may be diagnosed based on:  · Blood tests.  · Urine tests.  · Imaging tests, such as an ultrasound or CT scan.  · A test in which a sample of tissue is removed from the kidneys to be examined under a microscope (kidney biopsy).  These test results will help your health care provider determine how serious the CKD is.  How is this treated?  There is no cure for most cases of this condition, but treatment usually relieves symptoms and prevents or slows the progression of the disease. Treatment may include:  · Making diet changes, which may require you to avoid alcohol, salty foods (sodium), and foods that are high in potassium, calcium, and protein.  · Medicines:  ? To lower blood pressure.  ? To control blood glucose.  ? To relieve anemia.  ? To relieve swelling.  ? To protect your bones.  ? To improve the balance of electrolytes in your blood.  · Removing toxic waste from the body through types of  dialysis, if the kidneys can no longer do their job (kidney failure).  · Managing any other conditions that are causing your CKD or making it worse.  Follow these instructions at home:  Medicines  · Take over-the-counter and prescription medicines only as told by your health care provider. The dose of some medicines that you take may need to be adjusted.  · Do not take any new medicines unless approved by your health care provider. Many medicines can worsen your kidney damage.  · Do not take any vitamin and mineral supplements unless approved by your health care provider. Many nutritional supplements can worsen your kidney damage.  General instructions  · Follow your prescribed diet as told by your health care provider.  · Do not use any products that contain nicotine or tobacco, such as cigarettes and e-cigarettes. If you need help quitting, ask your health care provider.  · Monitor and track your blood pressure at home. Report changes in your blood pressure as told by your health care provider.  · If you are being treated for diabetes, monitor and track your blood sugar (blood glucose) levels as told by your health care provider.  · Maintain a healthy weight. If you need help with this, ask your health care provider.  · Start or continue an exercise plan. Exercise at least 30 minutes a day, 5 days a week.  · Keep your immunizations up to date as told by your health care provider.  · Keep all follow-up visits as told by your health care provider. This is important.  Where to find more information  · American Association of Kidney Patients: www.aakp.org  · National Kidney Foundation: www.kidney.org  · American Kidney Fund: www.akfinc.org  · [facility]: www.lifeoptions.org and www.kidneyschool.org  Contact a health care provider if:  · Your symptoms get worse.  · You develop new symptoms.  Get help right away if:  · You develop symptoms of ESRD, which include:  ? Headaches.  ? Numbness in the  "hands or feet.  ? Easy bruising.  ? Frequent hiccups.  ? Chest pain.  ? Shortness of breath.  ? Lack of menstruation, in women.  · You have a fever.  · You have decreased urine production.  · You have pain or bleeding when you urinate.  Summary  · Chronic kidney disease (CKD) occurs when the kidneys become damaged slowly over a long period of time.  · The most common causes of this condition are diabetes and high blood pressure (hypertension).  · There is no cure for most cases of this condition, but treatment usually relieves symptoms and prevents or slows the progression of the disease. Treatment may include a combination of medicines and lifestyle changes.  This information is not intended to replace advice given to you by your health care provider. Make sure you discuss any questions you have with your health care provider.  Document Released: 09/26/2009 Document Revised: 01/25/2018 Document Reviewed: 01/25/2018  [employer] Interactive Patient Education © 2019 [employer]    DASH Eating Plan  DASH stands for \"Dietary Approaches to Stop Hypertension.\" The DASH eating plan is a healthy eating plan that has been shown to reduce high blood pressure (hypertension). It may also reduce your risk for type 2 diabetes, heart disease, and stroke. The DASH eating plan may also help with weight loss.  What are tips for following this plan?    General guidelines  · Avoid eating more than 2,300 mg (milligrams) of salt (sodium) a day. If you have hypertension, you may need to reduce your sodium intake to 1,500 mg a day.  · Limit alcohol intake to no more than 1 drink a day for nonpregnant women and 2 drinks a day for men. One drink equals 12 oz of beer, 5 oz of wine, or 1½ oz of hard liquor.  · Work with your health care provider to maintain a healthy body weight or to lose weight. Ask what an ideal weight is for you.  · Get at least 30 minutes of exercise that causes your heart to beat faster (aerobic exercise) most days of " "the week. Activities may include walking, swimming, or biking.  · Work with your health care provider or diet and nutrition specialist (dietitian) to adjust your eating plan to your individual calorie needs.  Reading food labels    · Check food labels for the amount of sodium per serving. Choose foods with less than 5 percent of the Daily Value of sodium. Generally, foods with less than 300 mg of sodium per serving fit into this eating plan.  · To find whole grains, look for the word \"whole\" as the first word in the ingredient list.  Shopping  · Buy products labeled as \"low-sodium\" or \"no salt added.\"  · Buy fresh foods. Avoid canned foods and premade or frozen meals.  Cooking  · Avoid adding salt when cooking. Use salt-free seasonings or herbs instead of table salt or sea salt. Check with your health care provider or pharmacist before using salt substitutes.  · Do not street foods. Cook foods using healthy methods such as baking, boiling, grilling, and broiling instead.  · Cook with heart-healthy oils, such as olive, canola, soybean, or sunflower oil.  Meal planning  · Eat a balanced diet that includes:  ? 5 or more servings of fruits and vegetables each day. At each meal, try to fill half of your plate with fruits and vegetables.  ? Up to 6-8 servings of whole grains each day.  ? Less than 6 oz of lean meat, poultry, or fish each day. A 3-oz serving of meat is about the same size as a deck of cards. One egg equals 1 oz.  ? 2 servings of low-fat dairy each day.  ? A serving of nuts, seeds, or beans 5 times each week.  ? Heart-healthy fats. Healthy fats called Omega-3 fatty acids are found in foods such as flaxseeds and coldwater fish, like sardines, salmon, and mackerel.  · Limit how much you eat of the following:  ? Canned or prepackaged foods.  ? Food that is high in trans fat, such as fried foods.  ? Food that is high in saturated fat, such as fatty meat.  ? Sweets, desserts, sugary drinks, and other foods with " added sugar.  ? Full-fat dairy products.  · Do not salt foods before eating.  · Try to eat at least 2 vegetarian meals each week.  · Eat more home-cooked food and less restaurant, buffet, and fast food.  · When eating at a restaurant, ask that your food be prepared with less salt or no salt, if possible.  What foods are recommended?  The items listed may not be a complete list. Talk with your dietitian about what dietary choices are best for you.  Grains  Whole-grain or whole-wheat bread. Whole-grain or whole-wheat pasta. Brown rice. Oatmeal. Quinoa. Bulgur. Whole-grain and low-sodium cereals. Gracie bread. Low-fat, low-sodium crackers. Whole-wheat flour tortillas.  Vegetables  Fresh or frozen vegetables (raw, steamed, roasted, or grilled). Low-sodium or reduced-sodium tomato and vegetable juice. Low-sodium or reduced-sodium tomato sauce and tomato paste. Low-sodium or reduced-sodium canned vegetables.  Fruits  All fresh, dried, or frozen fruit. Canned fruit in natural juice (without added sugar).  Meat and other protein foods  Skinless chicken or turkey. Ground chicken or turkey. Pork with fat trimmed off. Fish and seafood. Egg whites. Dried beans, peas, or lentils. Unsalted nuts, nut butters, and seeds. Unsalted canned beans. Lean cuts of beef with fat trimmed off. Low-sodium, lean deli meat.  Dairy  Low-fat (1%) or fat-free (skim) milk. Fat-free, low-fat, or reduced-fat cheeses. Nonfat, low-sodium ricotta or cottage cheese. Low-fat or nonfat yogurt. Low-fat, low-sodium cheese.  Fats and oils  Soft margarine without trans fats. Vegetable oil. Low-fat, reduced-fat, or light mayonnaise and salad dressings (reduced-sodium). Canola, safflower, olive, soybean, and sunflower oils. Avocado.  Seasoning and other foods  Herbs. Spices. Seasoning mixes without salt. Unsalted popcorn and pretzels. Fat-free sweets.  What foods are not recommended?  The items listed may not be a complete list. Talk with your dietitian about what  dietary choices are best for you.  Grains  Baked goods made with fat, such as croissants, muffins, or some breads. Dry pasta or rice meal packs.  Vegetables  Creamed or fried vegetables. Vegetables in a cheese sauce. Regular canned vegetables (not low-sodium or reduced-sodium). Regular canned tomato sauce and paste (not low-sodium or reduced-sodium). Regular tomato and vegetable juice (not low-sodium or reduced-sodium). Pickles. Olives.  Fruits  Canned fruit in a light or heavy syrup. Fried fruit. Fruit in cream or butter sauce.  Meat and other protein foods  Fatty cuts of meat. Ribs. Fried meat. Ramirez. Sausage. Bologna and other processed lunch meats. Salami. Fatback. Hotdogs. Bratwurst. Salted nuts and seeds. Canned beans with added salt. Canned or smoked fish. Whole eggs or egg yolks. Chicken or turkey with skin.  Dairy  Whole or 2% milk, cream, and half-and-half. Whole or full-fat cream cheese. Whole-fat or sweetened yogurt. Full-fat cheese. Nondairy creamers. Whipped toppings. Processed cheese and cheese spreads.  Fats and oils  Butter. Stick margarine. Lard. Shortening. Ghee. Ramirez fat. Tropical oils, such as coconut, palm kernel, or palm oil.  Seasoning and other foods  Salted popcorn and pretzels. Onion salt, garlic salt, seasoned salt, table salt, and sea salt. Worcestershire sauce. Tartar sauce. Barbecue sauce. Teriyaki sauce. Soy sauce, including reduced-sodium. Steak sauce. Canned and packaged gravies. Fish sauce. Oyster sauce. Cocktail sauce. Horseradish that you find on the shelf. Ketchup. Mustard. Meat flavorings and tenderizers. Bouillon cubes. Hot sauce and Tabasco sauce. Premade or packaged marinades. Premade or packaged taco seasonings. Relishes. Regular salad dressings.  Where to find more information:  · National Heart, Lung, and [facility] [place]: www.nhlbi.nih.gov  · American Heart Association: www.heart.org  Summary  · The DASH eating plan is a healthy eating plan that has been shown to reduce  high blood pressure (hypertension). It may also reduce your risk for type 2 diabetes, heart disease, and stroke.  · With the DASH eating plan, you should limit salt (sodium) intake to 2,300 mg a day. If you have hypertension, you may need to reduce your sodium intake to 1,500 mg a day.  · When on the DASH eating plan, aim to eat more fresh fruits and vegetables, whole grains, lean proteins, low-fat dairy, and heart-healthy fats.  · Work with your health care provider or diet and nutrition specialist (dietitian) to adjust your eating plan to your individual calorie needs.  This information is not intended to replace advice given to you by your health care provider. Make sure you discuss any questions you have with your health care provider.  Document Released: 12/06/2012 Document Revised: 12/11/2017 Document Reviewed: 12/11/2017  [email or web address] Interactive Patient Education © 2019 [email or web address]    Dyslipidemia  Dyslipidemia is an imbalance of waxy, fat-like substances (lipids) in the blood. The body needs lipids in small amounts. Dyslipidemia often involves a high level of cholesterol or triglycerides, which are types of lipids.  Common forms of dyslipidemia include:  · High levels of LDL cholesterol. LDL is the type of cholesterol that causes fatty deposits (plaques) to build up in the blood vessels that carry blood away from your heart (arteries).  · Low levels of HDL cholesterol. HDL cholesterol is the type of cholesterol that protects against heart disease. High levels of HDL remove the LDL buildup from arteries.  · High levels of triglycerides. Triglycerides are a fatty substance in the blood that is linked to a buildup of plaques in the arteries.  What are the causes?  Primary dyslipidemia is caused by changes (mutations) in genes that are passed down through families (inherited). These mutations cause several types of dyslipidemia.  Secondary dyslipidemia is caused by lifestyle choices and diseases that lead to  dyslipidemia, such as:  · Eating a diet that is high in animal fat.  · Not getting enough exercise.  · Having diabetes, kidney disease, liver disease, or thyroid disease.  · Drinking large amounts of alcohol.  · Using certain medicines.  What increases the risk?  You are more likely to develop this condition if you are an older man or if you are a woman who has gone through menopause. Other risk factors include:  · Having a family history of dyslipidemia.  · Taking certain medicines, including birth control pills, steroids, some diuretics, and beta-blockers.  · Smoking cigarettes.  · Eating a high-fat diet.  · Having certain medical conditions such as diabetes, polycystic ovary syndrome (PCOS), kidney disease, liver disease, or hypothyroidism.  · Not exercising regularly.  · Being overweight or obese with too much belly fat.  What are the signs or symptoms?  In most cases, dyslipidemia does not usually cause any symptoms.  In severe cases, very high lipid levels can cause:  · Fatty bumps under the skin (xanthomas).  · White or gray ring around the black center (pupil) of the eye.  Very high triglyceride levels can cause inflammation of the pancreas (pancreatitis).  How is this diagnosed?  Your health care provider may diagnose dyslipidemia based on a routine blood test (fasting blood test). Because most people do not have symptoms of the condition, this blood testing (lipid profile) is done on adults age 20 and older and is repeated every 5 years. This test checks:  · Total cholesterol. This measures the total amount of cholesterol in your blood, including LDL cholesterol, HDL cholesterol, and triglycerides. A healthy number is below 200.  · LDL cholesterol. The target number for LDL cholesterol is different for each person, depending on individual risk factors. Ask your health care provider what your LDL cholesterol should be.  · HDL cholesterol. An HDL level of 60 or higher is best because it helps to protect  against heart disease. A number below 40 for men or below 50 for women increases the risk for heart disease.  · Triglycerides. A healthy triglyceride number is below 150.  If your lipid profile is abnormal, your health care provider may do other blood tests.  How is this treated?  Treatment depends on the type of dyslipidemia that you have and your other risk factors for heart disease and stroke. Your health care provider will have a target range for your lipid levels based on this information.  For many people, this condition may be treated by lifestyle changes, such as diet and exercise. Your health care provider may recommend that you:  · Get regular exercise.  · Make changes to your diet.  · Quit smoking if you smoke.  If diet changes and exercise do not help you reach your goals, your health care provider may also prescribe medicine to lower lipids. The most commonly prescribed type of medicine lowers your LDL cholesterol (statin drug). If you have a high triglyceride level, your provider may prescribe another type of drug (fibrate) or an omega-3 fish oil supplement, or both.  Follow these instructions at home:    Eating and drinking  · Follow instructions from your health care provider or dietitian about eating or drinking restrictions.  · Eat a healthy diet as told by your health care provider. This can help you reach and maintain a healthy weight, lower your LDL cholesterol, and raise your HDL cholesterol. This may include:  ? Limiting your calories, if you are overweight.  ? Eating more fruits, vegetables, whole grains, fish, and lean meats.  ? Limiting saturated fat, trans fat, and cholesterol.  · If you drink alcohol:  ? Limit how much you use.  ? Be aware of how much alcohol is in your drink. In the [place] one drink equals one 12 oz bottle of beer (355 mL), one 5 oz glass of wine (148 mL), or one 1½ oz glass of hard liquor (44 mL).  · Do not drink alcohol if:  ? Your health care provider tells you not to  drink.  ? You are pregnant, may be pregnant, or are planning to become pregnant.  Activity  · Get regular exercise. Start an exercise and strength training program as told by your health care provider. Ask your health care provider what activities are safe for you. Your health care provider may recommend:  ? 30 minutes of aerobic activity 4-6 days a week. Brisk walking is an example of aerobic activity.  ? Strength training 2 days a week.  General instructions  · Do not use any products that contain nicotine or tobacco, such as cigarettes, e-cigarettes, and chewing tobacco. If you need help quitting, ask your health care provider.  · Take over-the-counter and prescription medicines only as told by your health care provider. This includes supplements.  · Keep all follow-up visits as told by your health care provider.  Contact a health care provider if:  · You are:  ? Having trouble sticking to your exercise or diet plan.  ? Struggling to quit smoking or control your use of alcohol.  Summary  · Dyslipidemia often involves a high level of cholesterol or triglycerides, which are types of lipids.  · Treatment depends on the type of dyslipidemia that you have and your other risk factors for heart disease and stroke.  · For many people, treatment starts with lifestyle changes, such as diet and exercise.  · Your health care provider may prescribe medicine to lower lipids.  This information is not intended to replace advice given to you by your health care provider. Make sure you discuss any questions you have with your health care provider.  Document Released: 12/23/2014 Document Revised: 08/12/2019 Document Reviewed: 07/19/2019  [employer] Interactive Patient Education © 2019 [employer]    Health Maintenance, Male  A healthy lifestyle and preventive care is important for your health and wellness. Ask your health care provider about what schedule of regular examinations is right for you.  What should I know about weight  and diet?  Eat a Healthy Diet  · Eat plenty of vegetables, fruits, whole grains, low-fat dairy products, and lean protein.  · Do not eat a lot of foods high in solid fats, added sugars, or salt.    Maintain a Healthy Weight  Regular exercise can help you achieve or maintain a healthy weight. You should:  · Do at least 150 minutes of exercise each week. The exercise should increase your heart rate and make you sweat (moderate-intensity exercise).  · Do strength-training exercises at least twice a week.  Watch Your Levels of Cholesterol and Blood Lipids  · Have your blood tested for lipids and cholesterol every 5 years starting at 35 years of age. If you are at high risk for heart disease, you should start having your blood tested when you are 20 years old. You may need to have your cholesterol levels checked more often if:  ? Your lipid or cholesterol levels are high.  ? You are older than 50 years of age.  ? You are at high risk for heart disease.  What should I know about cancer screening?  Many types of cancers can be detected early and may often be prevented.  Lung Cancer  · You should be screened every year for lung cancer if:  ? You are a current smoker who has smoked for at least 30 years.  ? You are a former smoker who has quit within the past 15 years.  · Talk to your health care provider about your screening options, when you should start screening, and how often you should be screened.  Colorectal Cancer  · Routine colorectal cancer screening usually begins at 50 years of age and should be repeated every 5-10 years until you are 75 years old. You may need to be screened more often if early forms of precancerous polyps or small growths are found. Your health care provider may recommend screening at an earlier age if you have risk factors for colon cancer.  · Your health care provider may recommend using home test kits to check for hidden blood in the stool.  · A small camera at the end of a tube can be used  to examine your colon (sigmoidoscopy or colonoscopy). This checks for the earliest forms of colorectal cancer.  Prostate and Testicular Cancer  · Depending on your age and overall health, your health care provider may do certain tests to screen for prostate and testicular cancer.  · Talk to your health care provider about any symptoms or concerns you have about testicular or prostate cancer.  Skin Cancer  · Check your skin from head to toe regularly.  · Tell your health care provider about any new moles or changes in moles, especially if:  ? There is a change in a mole’s size, shape, or color.  ? You have a mole that is larger than a pencil eraser.  · Always use sunscreen. Apply sunscreen liberally and repeat throughout the day.  · Protect yourself by wearing long sleeves, pants, a wide-brimmed hat, and sunglasses when outside.  What should I know about heart disease, diabetes, and high blood pressure?  · If you are 18-39 years of age, have your blood pressure checked every 3-5 years. If you are 40 years of age or older, have your blood pressure checked every year. You should have your blood pressure measured twice--once when you are at a hospital or clinic, and once when you are not at a hospital or clinic. Record the average of the two measurements. To check your blood pressure when you are not at a hospital or clinic, you can use:  ? An automated blood pressure machine at a pharmacy.  ? A home blood pressure monitor.  · Talk to your health care provider about your target blood pressure.  · If you are between 45-79 years old, ask your health care provider if you should take aspirin to prevent heart disease.  · Have regular diabetes screenings by checking your fasting blood sugar level.  ? If you are at a normal weight and have a low risk for diabetes, have this test once every three years after the age of 45.  ? If you are overweight and have a high risk for diabetes, consider being tested at a younger age or more  often.  · A one-time screening for abdominal aortic aneurysm (AAA) by ultrasound is recommended for men aged 65-75 years who are current or former smokers.  What should I know about preventing infection?  Hepatitis B  If you have a higher risk for hepatitis B, you should be screened for this virus. Talk with your health care provider to find out if you are at risk for hepatitis B infection.  Hepatitis C  Blood testing is recommended for:  · Everyone born from 1945 through 1965.  · Anyone with known risk factors for hepatitis C.  Sexually Transmitted Diseases (STDs)  · You should be screened each year for STDs including gonorrhea and chlamydia if:  ? You are sexually active and are younger than 24 years of age.  ? You are older than 24 years of age and your health care provider tells you that you are at risk for this type of infection.  ? Your sexual activity has changed since you were last screened and you are at an increased risk for chlamydia or gonorrhea. Ask your health care provider if you are at risk.  · Talk with your health care provider about whether you are at high risk of being infected with HIV. Your health care provider may recommend a prescription medicine to help prevent HIV infection.  What else can I do?  · Schedule regular health, dental, and eye exams.  · Stay current with your vaccines (immunizations).  · Do not use any tobacco products, such as cigarettes, chewing tobacco, and e-cigarettes. If you need help quitting, ask your health care provider.  · Limit alcohol intake to no more than 2 drinks per day. One drink equals 12 ounces of beer, 5 ounces of wine, or 1½ ounces of hard liquor.  · Do not use street drugs.  · Do not share needles.  · Ask your health care provider for help if you need support or information about quitting drugs.  · Tell your health care provider if you often feel depressed.  · Tell your health care provider if you have ever been abused or do not feel safe at home.  This  "information is not intended to replace advice given to you by your health care provider. Make sure you discuss any questions you have with your health care provider.  Document Released: 06/15/2009 Document Revised: 08/16/2017 Document Reviewed: 09/20/2016  [employer] Interactive Patient Education © 2019 [employer]    Hypertension, Adult  High blood pressure (hypertension) is when the force of blood pumping through the arteries is too strong. The arteries are the blood vessels that carry blood from the heart throughout the body. Hypertension forces the heart to work harder to pump blood and may cause arteries to become narrow or stiff. Untreated or uncontrolled hypertension can cause a heart attack, heart failure, a stroke, kidney disease, and other problems.  A blood pressure reading consists of a higher number over a lower number. Ideally, your blood pressure should be below 120/80. The first (\"top\") number is called the systolic pressure. It is a measure of the pressure in your arteries as your heart beats. The second (\"bottom\") number is called the diastolic pressure. It is a measure of the pressure in your arteries as the heart relaxes.  What are the causes?  The exact cause of this condition is not known. There are some conditions that result in or are related to high blood pressure.  What increases the risk?  Some risk factors for high blood pressure are under your control. The following factors may make you more likely to develop this condition:  · Smoking.  · Having type 2 diabetes mellitus, high cholesterol, or both.  · Not getting enough exercise or physical activity.  · Being overweight.  · Having too much fat, sugar, calories, or salt (sodium) in your diet.  · Drinking too much alcohol.  Some risk factors for high blood pressure may be difficult or impossible to change. Some of these factors include:  · Having chronic kidney disease.  · Having a family history of high blood pressure.  · Age. Risk " increases with age.  · Race. You may be at higher risk if you are .  · Gender. Men are at higher risk than women before age 45. After age 65, women are at higher risk than men.  · Having obstructive sleep apnea.  · Stress.  What are the signs or symptoms?  High blood pressure may not cause symptoms. Very high blood pressure (hypertensive crisis) may cause:  · Headache.  · Anxiety.  · Shortness of breath.  · Nosebleed.  · Nausea and vomiting.  · Vision changes.  · Severe chest pain.  · Seizures.  How is this diagnosed?  This condition is diagnosed by measuring your blood pressure while you are seated, with your arm resting on a flat surface, your legs uncrossed, and your feet flat on the floor. The cuff of the blood pressure monitor will be placed directly against the skin of your upper arm at the level of your heart. It should be measured at least twice using the same arm. Certain conditions can cause a difference in blood pressure between your right and left arms.  Certain factors can cause blood pressure readings to be lower or higher than normal for a short period of time:  · When your blood pressure is higher when you are in a health care provider's office than when you are at home, this is called white coat hypertension. Most people with this condition do not need medicines.  · When your blood pressure is higher at home than when you are in a health care provider's office, this is called masked hypertension. Most people with this condition may need medicines to control blood pressure.  If you have a high blood pressure reading during one visit or you have normal blood pressure with other risk factors, you may be asked to:  · Return on a different day to have your blood pressure checked again.  · Monitor your blood pressure at home for 1 week or longer.  If you are diagnosed with hypertension, you may have other blood or imaging tests to help your health care provider understand your overall risk  for other conditions.  How is this treated?  This condition is treated by making healthy lifestyle changes, such as eating healthy foods, exercising more, and reducing your alcohol intake. Your health care provider may prescribe medicine if lifestyle changes are not enough to get your blood pressure under control, and if:  · Your systolic blood pressure is above 130.  · Your diastolic blood pressure is above 80.  Your personal target blood pressure may vary depending on your medical conditions, your age, and other factors.  Follow these instructions at home:  Eating and drinking    · Eat a diet that is high in fiber and potassium, and low in sodium, added sugar, and fat. An example eating plan is called the DASH (Dietary Approaches to Stop Hypertension) diet. To eat this way:  ? Eat plenty of fresh fruits and vegetables. Try to fill one half of your plate at each meal with fruits and vegetables.  ? Eat whole grains, such as whole-wheat pasta, brown rice, or whole-grain bread. Fill about one fourth of your plate with whole grains.  ? Eat or drink low-fat dairy products, such as skim milk or low-fat yogurt.  ? Avoid fatty cuts of meat, processed or cured meats, and poultry with skin. Fill about one fourth of your plate with lean proteins, such as fish, chicken without skin, beans, eggs, or tofu.  ? Avoid pre-made and processed foods. These tend to be higher in sodium, added sugar, and fat.  · Reduce your daily sodium intake. Most people with hypertension should eat less than 1,500 mg of sodium a day.  · Do not drink alcohol if:  ? Your health care provider tells you not to drink.  ? You are pregnant, may be pregnant, or are planning to become pregnant.  · If you drink alcohol:  ? Limit how much you use to:  § 0-1 drink a day for women.  § 0-2 drinks a day for men.  ? Be aware of how much alcohol is in your drink. In the [place] one drink equals one 12 oz bottle of beer (355 mL), one 5 oz glass of wine (148 mL), or one  1½ oz glass of hard liquor (44 mL).  Lifestyle    · Work with your health care provider to maintain a healthy body weight or to lose weight. Ask what an ideal weight is for you.  · Get at least 30 minutes of exercise most days of the week. Activities may include walking, swimming, or biking.  · Include exercise to strengthen your muscles (resistance exercise), such as Pilates or lifting weights, as part of your weekly exercise routine. Try to do these types of exercises for 30 minutes at least 3 days a week.  · Do not use any products that contain nicotine or tobacco, such as cigarettes, e-cigarettes, and chewing tobacco. If you need help quitting, ask your health care provider.  · Monitor your blood pressure at home as told by your health care provider.  · Keep all follow-up visits as told by your health care provider. This is important.  Medicines  · Take over-the-counter and prescription medicines only as told by your health care provider. Follow directions carefully. Blood pressure medicines must be taken as prescribed.  · Do not skip doses of blood pressure medicine. Doing this puts you at risk for problems and can make the medicine less effective.  · Ask your health care provider about side effects or reactions to medicines that you should watch for.  Contact a health care provider if you:  · Think you are having a reaction to a medicine you are taking.  · Have headaches that keep coming back (recurring).  · Feel dizzy.  · Have swelling in your ankles.  · Have trouble with your vision.  Get help right away if you:  · Develop a severe headache or confusion.  · Have unusual weakness or numbness.  · Feel faint.  · Have severe pain in your chest or abdomen.  · Vomit repeatedly.  · Have trouble breathing.  Summary  · Hypertension is when the force of blood pumping through your arteries is too strong. If this condition is not controlled, it may put you at risk for serious complications.  · Your personal target blood  pressure may vary depending on your medical conditions, your age, and other factors. For most people, a normal blood pressure is less than 120/80.  · Hypertension is treated with lifestyle changes, medicines, or a combination of both. Lifestyle changes include losing weight, eating a healthy, low-sodium diet, exercising more, and limiting alcohol.  This information is not intended to replace advice given to you by your health care provider. Make sure you discuss any questions you have with your health care provider.  Document Released: 12/18/2006 Document Revised: 08/28/2019 Document Reviewed: 08/28/2019  [employer] Interactive Patient Education © 2019 [employer]    Preventive Care 40-64 Years, Male  Preventive care refers to lifestyle choices and visits with your health care provider that can promote health and wellness.  What does preventive care include?    · A yearly physical exam. This is also called an annual well check.  · Dental exams once or twice a year.  · Routine eye exams. Ask your health care provider how often you should have your eyes checked.  · Personal lifestyle choices, including:  ? Daily care of your teeth and gums.  ? Regular physical activity.  ? Eating a healthy diet.  ? Avoiding tobacco and drug use.  ? Limiting alcohol use.  ? Practicing safe sex.  ? Taking low-dose aspirin every day starting at age 50.  What happens during an annual well check?  The services and screenings done by your health care provider during your annual well check will depend on your age, overall health, lifestyle risk factors, and family history of disease.  Counseling  Your health care provider may ask you questions about your:  · Alcohol use.  · Tobacco use.  · Drug use.  · Emotional well-being.  · Home and relationship well-being.  · Sexual activity.  · Eating habits.  · Work and work environment.  Screening  You may have the following tests or measurements:  · Height, weight, and BMI.  · Blood  pressure.  · Lipid and cholesterol levels. These may be checked every 5 years, or more frequently if you are over 50 years old.  · Skin check.  · Lung cancer screening. You may have this screening every year starting at age 55 if you have a 30-pack-year history of smoking and currently smoke or have quit within the past 15 years.  · Colorectal cancer screening. All adults should have this screening starting at age 50 and continuing until age 75. Your health care provider may recommend screening at age 45. You will have tests every 1-10 years, depending on your results and the type of screening test. People at increased risk should start screening at an earlier age. Screening tests may include:  ? Guaiac-based fecal occult blood testing.  ? Fecal immunochemical test (FIT).  ? Stool DNA test.  ? Virtual colonoscopy.  ? Sigmoidoscopy. During this test, a flexible tube with a tiny camera (sigmoidoscope) is used to examine your rectum and lower colon. The sigmoidoscope is inserted through your anus into your rectum and lower colon.  ? Colonoscopy. During this test, a long, thin, flexible tube with a tiny camera (colonoscope) is used to examine your entire colon and rectum.  · Prostate cancer screening. Recommendations will vary depending on your family history and other risks.  · Hepatitis C blood test.  · Hepatitis B blood test.  · Sexually transmitted disease (STD) testing.  · Diabetes screening. This is done by checking your blood sugar (glucose) after you have not eaten for a while (fasting). You may have this done every 1-3 years.  Discuss your test results, treatment options, and if necessary, the need for more tests with your health care provider.  Vaccines  Your health care provider may recommend certain vaccines, such as:  · Influenza vaccine. This is recommended every year.  · Tetanus, diphtheria, and acellular pertussis (Tdap, Td) vaccine. You may need a Td booster every 10 years.  · Varicella vaccine. You may  need this if you have not been vaccinated.  · Zoster vaccine. You may need this after age 60.  · Measles, mumps, and rubella (MMR) vaccine. You may need at least one dose of MMR if you were born in 1957 or later. You may also need a second dose.  · Pneumococcal 13-valent conjugate (PCV13) vaccine. You may need this if you have certain conditions and have not been vaccinated.  · Pneumococcal polysaccharide (PPSV23) vaccine. You may need one or two doses if you smoke cigarettes or if you have certain conditions.  · Meningococcal vaccine. You may need this if you have certain conditions.  · Hepatitis A vaccine. You may need this if you have certain conditions or if you travel or work in places where you may be exposed to hepatitis A.  · Hepatitis B vaccine. You may need this if you have certain conditions or if you travel or work in places where you may be exposed to hepatitis B.  · Haemophilus influenzae type b (Hib) vaccine. You may need this if you have certain risk factors.  Talk to your health care provider about which screenings and vaccines you need and how often you need them.  This information is not intended to replace advice given to you by your health care provider. Make sure you discuss any questions you have with your health care provider.  Document Released: 01/13/2017 Document Revised: 02/07/2019 Document Reviewed: 10/18/2016  [employer] Interactive Patient Education © 2019 [employer]  Influenza (Flu) Vaccine (Inactivated or Recombinant): What You Need to Know  1. Why get vaccinated?  Influenza vaccine can prevent influenza (flu).  Flu is a contagious disease that spreads around the [place] every year, usually between October and May. Anyone can get the flu, but it is more dangerous for some people. Infants and young children, people 65 years of age and older, pregnant women, and people with certain health conditions or a weakened immune system are at greatest risk of flu  complications.  Pneumonia, bronchitis, sinus infections and ear infections are examples of flu-related complications. If you have a medical condition, such as heart disease, cancer or diabetes, flu can make it worse.  Flu can cause fever and chills, sore throat, muscle aches, fatigue, cough, headache, and runny or stuffy nose. Some people may have vomiting and diarrhea, though this is more common in children than adults.  Each year thousands of people in the [place] die from flu, and many more are hospitalized. Flu vaccine prevents millions of illnesses and flu-related visits to the doctor each year.  2. Influenza vaccine  CDC recommends everyone 6 months of age and older get vaccinated every flu season. Children 6 months through 8 years of age may need 2 doses during a single flu season. Everyone else needs only 1 dose each flu season.  It takes about 2 weeks for protection to develop after vaccination.  There are many flu viruses, and they are always changing. Each year a new flu vaccine is made to protect against three or four viruses that are likely to cause disease in the upcoming flu season. Even when the vaccine doesn't exactly match these viruses, it may still provide some protection.  Influenza vaccine does not cause flu.  Influenza vaccine may be given at the same time as other vaccines.  3. Talk with your health care provider  Tell your vaccine provider if the person getting the vaccine:  · Has had an allergic reaction after a previous dose of influenza vaccine, or has any severe, life-threatening allergies.  · Has ever had Guillain-Barré Syndrome (also called GBS).  In some cases, your health care provider may decide to postpone influenza vaccination to a future visit.  People with minor illnesses, such as a cold, may be vaccinated. People who are moderately or severely ill should usually wait until they recover before getting influenza vaccine.  Your health care provider can give you more  information.  4. Risks of a vaccine reaction  · Soreness, redness, and swelling where shot is given, fever, muscle aches, and headache can happen after influenza vaccine.  · There may be a very small increased risk of Guillain-Barré Syndrome (GBS) after inactivated influenza vaccine (the flu shot).  Young children who get the flu shot along with pneumococcal vaccine (PCV13), and/or DTaP vaccine at the same time might be slightly more likely to have a seizure caused by fever. Tell your health care provider if a child who is getting flu vaccine has ever had a seizure.  People sometimes faint after medical procedures, including vaccination. Tell your provider if you feel dizzy or have vision changes or ringing in the ears.  As with any medicine, there is a very remote chance of a vaccine causing a severe allergic reaction, other serious injury, or death.  5. What if there is a serious problem?  An allergic reaction could occur after the vaccinated person leaves the clinic. If you see signs of a severe allergic reaction (hives, swelling of the face and throat, difficulty breathing, a fast heartbeat, dizziness, or weakness), call 9-1-1 and get the person to the nearest hospital.  For other signs that concern you, call your health care provider.  Adverse reactions should be reported to the Vaccine Adverse Event Reporting System (VAERS). Your health care provider will usually file this report, or you can do it yourself. Visit the VAERS website at www.vaers.hhs.gov or call 1-393.418.1765.VAERS is only for reporting reactions, and VAERS staff do not give medical advice.  6. The National Vaccine Injury Compensation Program  The National Vaccine Injury Compensation Program (VICP) is a federal program that was created to compensate people who may have been injured by certain vaccines. Visit the VICP website at www.hrsa.gov/vaccinecompensation or call 1-838.875.8349 to learn about the program and about filing a claim. There is a  time limit to file a claim for compensation.  7. How can I learn more?  · Ask your healthcare provider.  · Call your local or state health department.  · Contact the Centers for Disease Control and Prevention (CDC):  ? Call 1-191.762.9591 (0-401-UPJ-INFO) or  ? Visit CDC's www.cdc.gov/flu  Vaccine Information Statement (Interim) Inactivated Influenza Vaccine (8/15/2019)  This information is not intended to replace advice given to you by your health care provider. Make sure you discuss any questions you have with your health care provider.  Document Released: 10/12/2007 Document Revised: 08/19/2019 Document Reviewed: 08/19/2019  [employer] Interactive Patient Education © 2019 [employer]    Erectile Dysfunction  Erectile dysfunction (ED) is the inability to get or keep an erection in order to have sexual intercourse. Erectile dysfunction may include:  · Inability to get an erection.  · Lack of enough hardness of the erection to allow penetration.  · Loss of the erection before sex is finished.  What are the causes?  This condition may be caused by:  · Certain medicines, such as:  ? Pain relievers.  ? Antihistamines.  ? Antidepressants.  ? Blood pressure medicines.  ? Water pills (diuretics).  ? Ulcer medicines.  ? Muscle relaxants.  ? Drugs.  · Excessive drinking.  · Psychological causes, such as:  ? Anxiety.  ? Depression.  ? Sadness.  ? Exhaustion.  ? Performance fear.  ? Stress.  · Physical causes, such as:  ? Artery problems. This may include diabetes, smoking, liver disease, or atherosclerosis.  ? High blood pressure.  ? Hormonal problems, such as low testosterone.  ? Obesity.  ? Nerve problems. This may include back or pelvic injuries, diabetes mellitus, multiple sclerosis, or Parkinson disease.  What are the signs or symptoms?  Symptoms of this condition include:  · Inability to get an erection.  · Lack of enough hardness of the erection to allow penetration.  · Loss of the erection before sex is  finished.  · Normal erections at some times, but with frequent unsatisfactory episodes.  · Low sexual satisfaction in either partner due to erection problems.  · A curved penis occurring with erection. The curve may cause pain or the penis may be too curved to allow for intercourse.  · Never having nighttime erections.  How is this diagnosed?  This condition is often diagnosed by:  · Performing a physical exam to find other diseases or specific problems with the penis.  · Asking you detailed questions about the problem.  · Performing blood tests to check for diabetes mellitus or to measure hormone levels.  · Performing other tests to check for underlying health conditions.  · Performing an ultrasound exam to check for scarring.  · Performing a test to check blood flow to the penis.  · Doing a sleep study at home to measure nighttime erections.  How is this treated?  This condition may be treated by:  · Medicine taken by mouth to help you achieve an erection (oral medicine).  · Hormone replacement therapy to replace low testosterone levels.  · Medicine that is injected into the penis. Your health care provider may instruct you how to give yourself these injections at home.  · Vacuum pump. This is a pump with a ring on it. The pump and ring are placed on the penis and used to create pressure that helps the penis become erect.  · Penile implant surgery. In this procedure, you may receive:  ? An inflatable implant. This consists of cylinders, a pump, and a reservoir. The cylinders can be inflated with a fluid that helps to create an erection, and they can be deflated after intercourse.  ? A semi-rigid implant. This consists of two silicone rubber rods. The rods provide some rigidity. They are also flexible, so the penis can both curve downward in its normal position and become straight for sexual intercourse.  · Blood vessel surgery, to improve blood flow to the penis. During this procedure, a blood vessel from a  different part of the body is placed into the penis to allow blood to flow around (bypass) damaged or blocked blood vessels.  · Lifestyle changes, such as exercising more, losing weight, and quitting smoking.  Follow these instructions at home:  Medicines    · Take over-the-counter and prescription medicines only as told by your health care provider. Do not increase the dosage without first discussing it with your health care provider.  · If you are using self-injections, perform injections as directed by your health care provider. Make sure to avoid any veins that are on the surface of the penis. After giving an injection, apply pressure to the injection site for 5 minutes.  General instructions  · Exercise regularly, as directed by your health care provider. Work with your health care provider to lose weight, if needed.  · Do not use any products that contain nicotine or tobacco, such as cigarettes and e-cigarettes. If you need help quitting, ask your health care provider.  · Before using a vacuum pump, read the instructions that come with the pump and discuss any questions with your health care provider.  · Keep all follow-up visits as told by your health care provider. This is important.  Contact a health care provider if:  · You feel nauseous.  · You vomit.  Get help right away if:  · You are taking oral or injectable medicines and you have an erection that lasts longer than 4 hours. If your health care provider is unavailable, go to the nearest emergency room for evaluation. An erection that lasts much longer than 4 hours can result in permanent damage to your penis.  · You have severe pain in your groin or abdomen.  · You develop redness or severe swelling of your penis.  · You have redness spreading up into your groin or lower abdomen.  · You are unable to urinate.  · You experience chest pain or a rapid heart beat (palpitations) after taking oral medicines.  Summary  · Erectile dysfunction (ED) is the  inability to get or keep an erection during sexual intercourse. This problem can usually be treated successfully.  · This condition is diagnosed based on a physical exam, your symptoms, and tests to determine the cause. Treatment varies depending on the cause, and may include medicines, hormone therapy, surgery, or vacuum pump.  · You may need follow-up visits to make sure that you are using your medicines or devices correctly.  · Get help right away if you are taking or injecting medicines and you have an erection that lasts longer than 4 hours.  This information is not intended to replace advice given to you by your health care provider. Make sure you discuss any questions you have with your health care provider.  Document Released: 12/15/2001 Document Revised: 01/03/2018 Document Reviewed: 01/03/2018  [email or web address] Interactive Patient Education © 2019 [employer]

## 2020-02-15 DIAGNOSIS — R97.20 ELEVATED PSA: Primary | ICD-10-CM

## 2020-02-20 ENCOUNTER — OFFICE VISIT (OUTPATIENT)
Dept: CARDIOLOGY | Facility: HOSPITAL | Age: 60
End: 2020-02-20

## 2020-02-20 VITALS
RESPIRATION RATE: 20 BRPM | DIASTOLIC BLOOD PRESSURE: 80 MMHG | BODY MASS INDEX: 27.73 KG/M2 | WEIGHT: 223 LBS | TEMPERATURE: 97.5 F | HEIGHT: 75 IN | SYSTOLIC BLOOD PRESSURE: 128 MMHG | OXYGEN SATURATION: 98 % | HEART RATE: 74 BPM

## 2020-02-20 DIAGNOSIS — Z82.49 FAMILY HISTORY OF HEART DISEASE: ICD-10-CM

## 2020-02-20 DIAGNOSIS — I44.0 FIRST DEGREE AV BLOCK: ICD-10-CM

## 2020-02-20 DIAGNOSIS — I10 ESSENTIAL HYPERTENSION: Primary | ICD-10-CM

## 2020-02-20 PROCEDURE — 99214 OFFICE O/P EST MOD 30 MIN: CPT | Performed by: NURSE PRACTITIONER

## 2020-02-20 NOTE — PROGRESS NOTES
[facility]      Encounter Date:02/20/2020     Aron Driscoll  [address] [place] [id number]  [unfilled]    1960    Julia Moreau APRN    Aron Driscoll is a 59 y.o. male.      Subjective:     Chief Complaint:  Hypertension and Establish Care       HPI     59-year-old male presented to his primary care provider's office on 2/13/2020 for his annual physical exam.  Patient has a history of hypertension greater than 1 year.  Currently treated by Norvasc, lisinopril/hydrochlorothiazide.  He has history of chronic kidney disease stage III, glaucoma, hyperlipidemia currently on Norvasc.  Family history of hypertension, and heart failure.    Patient tells me today that he is very active.  He is able to climb steps without exertional chest pain, pressure, dyspnea, palpitations, dizziness, near syncope, syncope, edema.  He has no complaints or concerns.  Overall feels that he is doing well.    Cardiac risk factors:  History of dyslipidemia, hypertension,  Tobacco use (remote), sedentary lifestyle, gender, age (older than 55 for men, 65 for women)      Patient Active Problem List    Diagnosis Date Noted   • HLD (hyperlipidemia) 02/13/2020   • HTN (hypertension) 02/13/2020   • CKD (chronic kidney disease) stage 3, GFR 30-59 ml/min (CMS/[facility]) 02/13/2020   • Glaucoma 02/13/2020         Past Surgical History:   Procedure Laterality Date   • COLONOSCOPY  2015   • GLAUCOMA SURGERY Left 2018       No Known Allergies      Current Outpatient Medications:   •  amLODIPine (NORVASC) 10 MG tablet, Take 1 tablet by mouth Daily., Disp: 90 tablet, Rfl: 3  •  atorvastatin (LIPITOR) 20 MG tablet, Take 1 tablet by mouth Daily., Disp: 90 tablet, Rfl: 2  •  dorzolamide-timolol (COSOPT) 22.3-6.8 MG/ML ophthalmic solution, instill 1 drop into both eyes twice a day, Disp: , Rfl: 1  •  latanoprost (XALATAN) 0.005 % ophthalmic solution, Administer 1 drop to both eyes every night at bedtime.,  "Disp: , Rfl: 0  •  lisinopril-hydrochlorothiazide (PRINZIDE,ZESTORETIC) 20-25 MG per tablet, Take 1 tablet by mouth Daily., Disp: 90 tablet, Rfl: 3  •  sildenafil (VIAGRA) 100 MG tablet, Take 1 tablet by mouth Daily As Needed for Erectile Dysfunction., Disp: 9 tablet, Rfl: 11    The following portions of the patient's history were reviewed and updated today during office visit as appropriate: allergies, current medications, past family history, past medical history, past social history, past surgical history and problem list.    Review of Systems   All other systems reviewed and are negative.      Objective:     Vitals:    02/20/20 1033 02/20/20 1034 02/20/20 1035   BP: 134/90 132/79 128/80   BP Location: Right arm Right arm Left arm   Patient Position: Standing Sitting Sitting   Cuff Size: Adult Adult Adult   Pulse: 75 74 74   Resp:   20   Temp:   97.5 °F (36.4 °C)   TempSrc:   Temporal   SpO2: 98% 98% 98%   Weight:   101 kg (223 lb)   Height:   190.5 cm (75\")         Physical Exam   Constitutional: He is oriented to person, place, and time. He appears well-developed and well-nourished. No distress.   HENT:   Mouth/Throat: Oropharynx is clear and moist.   Eyes: No scleral icterus.   Neck: No hepatojugular reflux and no JVD present. Carotid bruit is not present. No tracheal deviation present. No thyromegaly present.   Cardiovascular: Normal rate, regular rhythm, normal heart sounds and intact distal pulses. Exam reveals no friction rub.   No murmur heard.  Pulmonary/Chest: Effort normal and breath sounds normal.   Abdominal: Soft. Bowel sounds are normal. He exhibits no distension. There is no tenderness.   Musculoskeletal: He exhibits no edema.   Lymphadenopathy:     He has no cervical adenopathy.   Neurological: He is alert and oriented to person, place, and time.   Skin: Skin is warm, dry and intact. No rash noted. No cyanosis or erythema. No pallor.   Psychiatric: He has a normal mood and affect. His behavior is " normal. Thought content normal.   Vitals reviewed.      Lab and Diagnostic Review:    Routine EKG completed on 2/13/2020 with sinus rhythm, first-degree AV block, nonspecific ST changes.  Heart rate 60 bpm, AK interval 289 ms, QRS 98 ms,  ms.    Reviewed EKG from 2/5/2019: Sinus bradycardia 59 bpm with first-degree AV block, AK interval 255 ms, QRS 94 ms,  ms.    Office Visit on 02/13/2020   Component Date Value Ref Range Status   • Glucose 02/13/2020 86  65 - 99 mg/dL Final   • BUN 02/13/2020 15  6 - 20 mg/dL Final   • Creatinine 02/13/2020 1.22  0.76 - 1.27 mg/dL Final   • Sodium 02/13/2020 138  136 - 145 mmol/L Final   • Potassium 02/13/2020 3.8  3.5 - 5.2 mmol/L Final   • Chloride 02/13/2020 98  98 - 107 mmol/L Final   • CO2 02/13/2020 29.3* 22.0 - 29.0 mmol/L Final   • Calcium 02/13/2020 9.6  8.6 - 10.5 mg/dL Final   • Total Protein 02/13/2020 7.2  6.0 - 8.5 g/dL Final   • Albumin 02/13/2020 4.50  3.50 - 5.20 g/dL Final   • ALT (SGPT) 02/13/2020 20  1 - 41 U/L Final   • AST (SGOT) 02/13/2020 17  1 - 40 U/L Final   • Alkaline Phosphatase 02/13/2020 60  39 - 117 U/L Final   • Total Bilirubin 02/13/2020 0.3  0.2 - 1.2 mg/dL Final   • eGFR  African Amer 02/13/2020 74  >60 mL/min/1.73 Final   • Globulin 02/13/2020 2.7  gm/dL Final   • A/G Ratio 02/13/2020 1.7  g/dL Final   • BUN/Creatinine Ratio 02/13/2020 12.3  7.0 - 25.0 Final   • Anion Gap 02/13/2020 10.7  5.0 - 15.0 mmol/L Final   • TSH 02/13/2020 3.090  0.270 - 4.200 uIU/mL Final   • WBC 02/13/2020 3.97  3.40 - 10.80 10*3/mm3 Final   • RBC 02/13/2020 5.12  4.14 - 5.80 10*6/mm3 Final   • Hemoglobin 02/13/2020 14.5  13.0 - 17.7 g/dL Final   • Hematocrit 02/13/2020 44.6  37.5 - 51.0 % Final   • MCV 02/13/2020 87.1  79.0 - 97.0 fL Final   • MCH 02/13/2020 28.3  26.6 - 33.0 pg Final   • MCHC 02/13/2020 32.5  31.5 - 35.7 g/dL Final   • RDW 02/13/2020 13.2  12.3 - 15.4 % Final   • RDW-SD 02/13/2020 42.3  37.0 - 54.0 fl Final   • MPV 02/13/2020 10.1  6.0  - 12.0 fL Final   • Platelets 02/13/2020 210  140 - 450 10*3/mm3 Final   • Neutrophil % 02/13/2020 38.4* 42.7 - 76.0 % Final   • Lymphocyte % 02/13/2020 48.1* 19.6 - 45.3 % Final   • Monocyte % 02/13/2020 10.6  5.0 - 12.0 % Final   • Eosinophil % 02/13/2020 1.8  0.3 - 6.2 % Final   • Basophil % 02/13/2020 0.8  0.0 - 1.5 % Final   • Immature Grans % 02/13/2020 0.3  0.0 - 0.5 % Final   • Neutrophils, Absolute 02/13/2020 1.53* 1.70 - 7.00 10*3/mm3 Final   • Lymphocytes, Absolute 02/13/2020 1.91  0.70 - 3.10 10*3/mm3 Final   • Monocytes, Absolute 02/13/2020 0.42  0.10 - 0.90 10*3/mm3 Final   • Eosinophils, Absolute 02/13/2020 0.07  0.00 - 0.40 10*3/mm3 Final   • Basophils, Absolute 02/13/2020 0.03  0.00 - 0.20 10*3/mm3 Final   • Immature Grans, Absolute 02/13/2020 0.01  0.00 - 0.05 10*3/mm3 Final   • nRBC 02/13/2020 0.0  0.0 - 0.2 /100 WBC Final   • Total Cholesterol 02/13/2020 112  0 - 200 mg/dL Final   • Triglycerides 02/13/2020 61  0 - 150 mg/dL Final   • HDL Cholesterol 02/13/2020 31* 40 - 60 mg/dL Final   • LDL Cholesterol  02/13/2020 69  0 - 100 mg/dL Final   • VLDL Cholesterol 02/13/2020 12.2  5 - 40 mg/dL Final   • LDL/HDL Ratio 02/13/2020 2.22   Final   • Color 02/13/2020 Yellow  Yellow, Straw, Dark Yellow, Mei Final   • Clarity, UA 02/13/2020 Clear  Clear Final   • Specific Gravity  02/13/2020 1.020  1.005 - 1.030 Final   • pH, Urine 02/13/2020 5.5  5.0 - 8.0 Final   • Leukocytes 02/13/2020 Negative  Negative Final   • Nitrite, UA 02/13/2020 Negative  Negative Final   • Protein, POC 02/13/2020 Negative  Negative mg/dL Final   • Glucose, UA 02/13/2020 Negative  Negative, 1000 mg/dL (3+) mg/dL Final   • Ketones, UA 02/13/2020 Negative  Negative Final   • Urobilinogen, UA 02/13/2020 Normal  Normal Final   • Bilirubin 02/13/2020 Negative  Negative Final   • Blood, UA 02/13/2020 Negative  Negative Final   • PSA 02/13/2020 5.130* 0.000 - 4.000 ng/mL Final       Assessment and Plan:         1. Essential  hypertension  Controlled on norvsc, lisinopril/HCTZ    - Adult Transthoracic Echo Complete W/ Cont if Necessary Per Protocol; Future    2. First degree AV block  Stable on EKG    - Adult Transthoracic Echo Complete W/ Cont if Necessary Per Protocol; Future    3. Family history of heart disease  No exertional CP, pressure, dyspnea, ischemic equivalent  echocardiogram.  Continue blood pressure control, cholesterol management with primary care provider    F/u with PCP on routine basis.  [facility] as needed and pending echo results.         *Please note that portions of this note were completed with a voice recognition program. Efforts were made to edit the dictations, but occasionally words are mistranscribed.

## 2020-02-28 ENCOUNTER — HOSPITAL ENCOUNTER (OUTPATIENT)
Dept: CARDIOLOGY | Facility: HOSPITAL | Age: 60
Discharge: HOME | End: 2020-02-28
Admitting: NURSE PRACTITIONER

## 2020-02-28 VITALS — BODY MASS INDEX: 27.73 KG/M2 | WEIGHT: 223 LBS | HEIGHT: 75 IN

## 2020-02-28 DIAGNOSIS — I44.0 FIRST DEGREE AV BLOCK: ICD-10-CM

## 2020-02-28 DIAGNOSIS — I10 ESSENTIAL HYPERTENSION: ICD-10-CM

## 2020-02-28 LAB
ASCENDING AORTA: 3.5 CM
BH CV ECHO MEAS - AO MAX PG (FULL): -0.16 MMHG
BH CV ECHO MEAS - AO MAX PG: 4 MMHG
BH CV ECHO MEAS - AO MEAN PG (FULL): 1 MMHG
BH CV ECHO MEAS - AO MEAN PG: 3 MMHG
BH CV ECHO MEAS - AO ROOT AREA: 13.9 CM^2
BH CV ECHO MEAS - AO ROOT DIAM: 4.2 CM
BH CV ECHO MEAS - AO V2 MAX: 104 CM/SEC
BH CV ECHO MEAS - AO V2 MEAN: 73.1 CM/SEC
BH CV ECHO MEAS - AO V2 VTI: 22.1 CM
BH CV ECHO MEAS - ASC AORTA: 3.5 CM
BH CV ECHO MEAS - AVA(I,A): 3.2 CM^2
BH CV ECHO MEAS - AVA(I,D): 3.2 CM^2
BH CV ECHO MEAS - AVA(V,A): 3.7 CM^2
BH CV ECHO MEAS - AVA(V,D): 3.7 CM^2
BH CV ECHO MEAS - BZI_METRIC_WEIGHT: 103.3 KG
BH CV ECHO MEAS - EDV(CUBED): 116.9 ML
BH CV ECHO MEAS - EDV(MOD-SP2): 84 ML
BH CV ECHO MEAS - EDV(MOD-SP4): 79 ML
BH CV ECHO MEAS - EDV(TEICH): 112.3 ML
BH CV ECHO MEAS - EF(CUBED): 78.9 %
BH CV ECHO MEAS - EF(MOD-BP): 58 %
BH CV ECHO MEAS - EF(MOD-SP2): 56 %
BH CV ECHO MEAS - EF(MOD-SP4): 60.8 %
BH CV ECHO MEAS - EF(TEICH): 71.1 %
BH CV ECHO MEAS - ESV(CUBED): 24.6 ML
BH CV ECHO MEAS - ESV(MOD-SP2): 37 ML
BH CV ECHO MEAS - ESV(MOD-SP4): 31 ML
BH CV ECHO MEAS - ESV(TEICH): 32.5 ML
BH CV ECHO MEAS - FS: 40.5 %
BH CV ECHO MEAS - IVS/LVPW: 1.1
BH CV ECHO MEAS - IVSD: 1.2 CM
BH CV ECHO MEAS - LA DIMENSION: 3.3 CM
BH CV ECHO MEAS - LA/AO: 0.79
BH CV ECHO MEAS - LAD MAJOR: 5.7 CM
BH CV ECHO MEAS - LAT PEAK E' VEL: 8.6 CM/SEC
BH CV ECHO MEAS - LATERAL E/E' RATIO: 7.1
BH CV ECHO MEAS - LV MASS(C)D: 223 GRAMS
BH CV ECHO MEAS - LV MAX PG: 4.2 MMHG
BH CV ECHO MEAS - LV MEAN PG: 2 MMHG
BH CV ECHO MEAS - LV V1 MAX: 102 CM/SEC
BH CV ECHO MEAS - LV V1 MEAN: 59.7 CM/SEC
BH CV ECHO MEAS - LV V1 VTI: 18.7 CM
BH CV ECHO MEAS - LVIDD: 4.9 CM
BH CV ECHO MEAS - LVIDS: 2.9 CM
BH CV ECHO MEAS - LVLD AP2: 8.4 CM
BH CV ECHO MEAS - LVLD AP4: 8.4 CM
BH CV ECHO MEAS - LVLS AP2: 7.2 CM
BH CV ECHO MEAS - LVLS AP4: 6.8 CM
BH CV ECHO MEAS - LVOT AREA (M): 3.8 CM^2
BH CV ECHO MEAS - LVOT AREA: 3.8 CM^2
BH CV ECHO MEAS - LVOT DIAM: 2.2 CM
BH CV ECHO MEAS - LVPWD: 1.2 CM
BH CV ECHO MEAS - MED PEAK E' VEL: 6 CM/SEC
BH CV ECHO MEAS - MEDIAL E/E' RATIO: 10.3
BH CV ECHO MEAS - MR ALIAS VEL: 30.8 CM/SEC
BH CV ECHO MEAS - MR ERO: 0.05 CM^2
BH CV ECHO MEAS - MR FLOW RATE: 31 CM^3/SEC
BH CV ECHO MEAS - MR MAX PG: 129 MMHG
BH CV ECHO MEAS - MR MAX VEL: 567 CM/SEC
BH CV ECHO MEAS - MR MEAN PG: 92 MMHG
BH CV ECHO MEAS - MR MEAN VEL: 456 CM/SEC
BH CV ECHO MEAS - MR PISA RADIUS: 0.4 CM
BH CV ECHO MEAS - MR PISA: 1 CM^2
BH CV ECHO MEAS - MR VOLUME: 12.1 ML
BH CV ECHO MEAS - MR VTI: 221 CM
BH CV ECHO MEAS - MV A MAX VEL: 57 CM/SEC
BH CV ECHO MEAS - MV AREA (1 DIAM): 6.2 CM^2
BH CV ECHO MEAS - MV DEC TIME: 0.3 SEC
BH CV ECHO MEAS - MV DIAM: 2.8 CM
BH CV ECHO MEAS - MV E MAX VEL: 61.2 CM/SEC
BH CV ECHO MEAS - MV E/A: 1.1
BH CV ECHO MEAS - MV FLOW AREA(1DIAM): 6.2 CM^2
BH CV ECHO MEAS - MV MAX PG: 2 MMHG
BH CV ECHO MEAS - MV MEAN PG: 1 MMHG
BH CV ECHO MEAS - MV V2 MAX: 70.2 CM/SEC
BH CV ECHO MEAS - MV V2 MEAN: 49.3 CM/SEC
BH CV ECHO MEAS - MV V2 VTI: 21.4 CM
BH CV ECHO MEAS - MVA(VTI): 3.3 CM^2
BH CV ECHO MEAS - PA ACC SLOPE: 295 CM/SEC^2
BH CV ECHO MEAS - PA ACC TIME: 0.16 SEC
BH CV ECHO MEAS - PA MAX PG: 2.3 MMHG
BH CV ECHO MEAS - PA PR(ACCEL): 6.1 MMHG
BH CV ECHO MEAS - PA V2 MAX: 75 CM/SEC
BH CV ECHO MEAS - RAP SYSTOLE: 8 MMHG
BH CV ECHO MEAS - RF(MV,AO)(1 DIAM): -1.3
BH CV ECHO MEAS - RF(MV,LVOT)(1DIAM): 0.46
BH CV ECHO MEAS - RVSP: 23.8 MMHG
BH CV ECHO MEAS - SV(AO): 306.2 ML
BH CV ECHO MEAS - SV(CUBED): 92.3 ML
BH CV ECHO MEAS - SV(LVOT): 71.1 ML
BH CV ECHO MEAS - SV(MOD-SP2): 47 ML
BH CV ECHO MEAS - SV(MOD-SP4): 48 ML
BH CV ECHO MEAS - SV(MV 1 DIAM): 131.8 ML
BH CV ECHO MEAS - SV(TEICH): 79.8 ML
BH CV ECHO MEAS - TAPSE (>1.6): 2.1 CM2
BH CV ECHO MEAS - TR MAX PG: 15.8 MMHG
BH CV ECHO MEAS - TR MAX VEL: 199 CM/SEC
BH CV ECHO MEASUREMENTS AVERAGE E/E' RATIO: 8.38
BH CV VAS BP RIGHT ARM: NORMAL MMHG
BH CV XLRA - RV BASE: 3.6 CM
BH CV XLRA - RV LENGTH: 6.6 CM
BH CV XLRA - RV MID: 3.1 CM
BH CV XLRA - TDI S': 11.5 CM/SEC
LEFT ATRIUM VOLUME: 58 ML
MR PISA EROA: 0.05 CM2
MV REGURGITANT FRACTION: 9 %
MV REGURGITANT VOLUME: 12 CC
MV VENA CONTRACTA: 0.48 CM
PISA ALIASING VEL: 30.8 M/S
PISA RADIUS: 0.4 CM

## 2020-02-28 PROCEDURE — 93306 TTE W/DOPPLER COMPLETE: CPT

## 2020-02-28 PROCEDURE — 93306 TTE W/DOPPLER COMPLETE: CPT | Performed by: INTERNAL MEDICINE

## 2020-03-04 ENCOUNTER — TELEPHONE (OUTPATIENT)
Dept: CARDIOLOGY | Facility: HOSPITAL | Age: 60
End: 2020-03-04

## 2020-03-04 DIAGNOSIS — I10 ESSENTIAL HYPERTENSION: ICD-10-CM

## 2020-03-04 DIAGNOSIS — I77.810 AORTIC ROOT DILATION (HCC): Primary | ICD-10-CM

## 2020-03-04 DIAGNOSIS — I34.0 MITRAL VALVE INSUFFICIENCY, UNSPECIFIED ETIOLOGY: ICD-10-CM

## 2020-03-04 NOTE — TELEPHONE ENCOUNTER
Called to review echo results.  No answer. Left message for patient to call back.     Echo with normal EF.  Mild to moderate MR.  Mild aortic root dilation 4.2 cm.    Plan to refer to [facility] for continued monitoring and management.

## 2020-03-05 ENCOUNTER — TELEPHONE (OUTPATIENT)
Dept: CARDIOLOGY | Facility: HOSPITAL | Age: 60
End: 2020-03-05

## 2020-03-05 NOTE — TELEPHONE ENCOUNTER
Called and reviewed echo results with patient and pt agreed to referral to [facility] for continued management.

## 2020-04-07 NOTE — PROGRESS NOTES
[facility]  Consultation note    Subjective:     Patient ID: Aron Driscoll is a  59 y.o. male.  Referring provider: CHELSIE Saini     Reason for consultation:   Chief Complaint   Patient presents with   • Hypertension        Problem List:  1. Hypertensive heart disease:  a. Echocardiogram, 02/28/2020: EF 56-60% with mild concentric LVH. Mild-to-moderate MR, mild TR, RVSP < 35 mmHg.    b. First-degree AV block on ECG.  2. Hypertension.  3. Palpitations.  4. Hyperlipidemia.  5. Aortic root dilation:  a. Echocardiogram, 02/28/2020: Mild dilation of the aortic root (4.2 cm)  6. Family history of heart disease.  7. Chronic kidney disease.    No Known Allergies      Current Outpatient Medications:   •  amLODIPine (NORVASC) 10 MG tablet, Take 1 tablet by mouth Daily., Disp: 90 tablet, Rfl: 3  •  atorvastatin (LIPITOR) 20 MG tablet, Take 1 tablet by mouth Daily., Disp: 90 tablet, Rfl: 2  •  dorzolamide-timolol (COSOPT) 22.3-6.8 MG/ML ophthalmic solution, instill 1 drop into both eyes twice a day, Disp: , Rfl: 1  •  latanoprost (XALATAN) 0.005 % ophthalmic solution, Administer 1 drop to both eyes every night at bedtime., Disp: , Rfl: 0  •  lisinopril-hydrochlorothiazide (PRINZIDE,ZESTORETIC) 20-25 MG per tablet, Take 1 tablet by mouth Daily., Disp: 90 tablet, Rfl: 3  •  sildenafil (VIAGRA) 100 MG tablet, Take 1 tablet by mouth Daily As Needed for Erectile Dysfunction., Disp: 9 tablet, Rfl: 11    HPI:      Aron Driscoll is a 59 y.o. male who is referred for evaluation of abnormal echocardiogram, first-degree AV block and cardiac risk factors.  The patient is originally from [place] and has lived in [place] for almost 15 years.  States that he was diagnosed with hypertension several years ago and since then has been on medications for blood pressure and cholesterol and has done fairly well.  He is currently working as a caregiver to brain injury patients and states that  his is mostly light duty.  He does not engage in regular exercise but remains fairly active at work and at home.  He has no current complaints of chest pain shortness of breath edema orthopnea PND dizziness lightheadedness or syncope.  He recalls that for many years he has experienced off and on palpitations which he describes as rapid heartbeat.  He still experiences this when he hears bad news or with sudden stress.  Denies fever chills abdominal pain nausea vomiting diarrhea constipation or urinary symptoms.  No symptoms of stroke.    Cardiac Risk Factors:  The following portions of the patient's history were reviewed and updated as appropriate: allergies, current medications and problem list.    Social History     Socioeconomic History   • Marital status:      Spouse name: Del   • Number of children: Not on file   • Years of education: H.S.   • Highest education level: High school graduate   Occupational History   • Occupation: Caregiver     Employer: [employer]   Tobacco Use   • Smoking status: Former Smoker     Packs/day: 1.00     Years: 15.00     Pack years: 15.00     Last attempt to quit:      Years since quittin.2   • Smokeless tobacco: Never Used   Substance and Sexual Activity   • Alcohol use: Not Currently     Frequency: Never   • Drug use: No   • Sexual activity: Yes     Partners: Female   Social History Narrative    Caffeine: None     Family History   Problem Relation Age of Onset   • Heart disease Mother ( of sudden death during her sleep)  73   • Bone cancer Father    • Hypertension Father    • Glaucoma Sister    • Hypertension and CVA Sister  62   • Hypertension Brother        Review of Systems:    Constitution: Negative for chills, fatigue, fever, and generalized weakness.   Cardiovascular: Pertinent positives in HPI, otherwise negative.  Respiratory: Pertinent positives in HPI, otherwise negative.  HENT: Negative for ear pain, nosebleeds, and  tinnitus.  Gastrointestinal: Negative for abdominal pain, constipation, diarrhea, nausea and vomiting.   Genitourinary: No urinary symptoms   Musculoskeletal: Negative for muscle cramps.  Neurological: Negative for headaches, loss of balance, numbness, and symptoms of stroke.  Psychiatric: Normal mental status.           Objective:     Vitals:    04/08/20 1002   BP: 120/73   Pulse: 60       PHYSICAL EXAM:  Telephone visit.    Diagnostic Data (reviewed):    Lab Results   Component Value Date    GLUCOSE 86 02/13/2020    BUN 15 02/13/2020    CREATININE 1.22 02/13/2020    EGFRIFAFRI 74 02/13/2020    BCR 12.3 02/13/2020    K 3.8 02/13/2020    CO2 29.3 (H) 02/13/2020    CALCIUM 9.6 02/13/2020    ALBUMIN 4.50 02/13/2020    ALKPHOS 60 02/13/2020    AST 17 02/13/2020    ALT 20 02/13/2020      Lab Results   Component Value Date    WBC 3.97 02/13/2020    RBC 5.12 02/13/2020    HGB 14.5 02/13/2020    HCT 44.6 02/13/2020    MCV 87.1 02/13/2020    MCH 28.3 02/13/2020     02/13/2020      Lab Results   Component Value Date    CHOL 112 02/13/2020    CHLPL 166 09/22/2015    TRIG 61 02/13/2020    HDL 31 (L) 02/13/2020    LDL 69 02/13/2020      Lab Results   Component Value Date    HGBA1C 5.40 02/05/2019        Procedures   Diagnosis Plan   1. Hypertensive heart disease without heart failure  abnormal echocardiographic findings discussed with the patient, he has no angina or heart failure symptoms.  Continue current management of hypertension with goal of less than 130/80 and we will repeat an echocardiogram in future for reassessment.   2. Palpitations   Holter monitor.   3. Essential hypertension   well-controlled, continue current medications.   4. Mixed hyperlipidemia   well-controlled, continue current medications.   5. Aortic root dilation (CMS/[facility])  managed hypertension, reevaluate in future with echocardiogram.       Plan:   Diet, exercise and weight loss of 20 to 30 pounds recommended.  The importance of optimal control  of blood pressure discussed, currently he has no symptoms of angina or CHF.  His labs are stable and monitored by PCP.  We will continue current medications and do a 48-hour Holter monitor to further assess his palpitations especially to make sure that he is not experiencing paroxysmal atrial fibrillation.  Follow-up with me in 3 months, we will consider repeating an echocardiogram in 6 months to a year.    This patient has consented to a telehealth visit via telephone. The visit was scheduled as a telephone visit to comply with patient safety concerns in accordance with CDC recommendations.  All vitals recorded within this visit are reported by the patient.  I spent 30 minutes in total including but not limited to the 20 minutes spent in direct conversation with this patient.        Bel Umana MD, FAC, [facility]      Please note that portions of this note may have been completed with a voice recognition program. Efforts were made to edit the dictations, but occasionally words are mistranscribed.

## 2020-04-08 ENCOUNTER — OFFICE VISIT (OUTPATIENT)
Dept: CARDIOLOGY | Facility: CLINIC | Age: 60
End: 2020-04-08

## 2020-04-08 VITALS
BODY MASS INDEX: 28.62 KG/M2 | WEIGHT: 223 LBS | HEART RATE: 60 BPM | SYSTOLIC BLOOD PRESSURE: 120 MMHG | HEIGHT: 74 IN | DIASTOLIC BLOOD PRESSURE: 73 MMHG

## 2020-04-08 DIAGNOSIS — I10 ESSENTIAL HYPERTENSION: ICD-10-CM

## 2020-04-08 DIAGNOSIS — R00.2 PALPITATIONS: ICD-10-CM

## 2020-04-08 DIAGNOSIS — I77.810 AORTIC ROOT DILATION (HCC): ICD-10-CM

## 2020-04-08 DIAGNOSIS — E78.2 MIXED HYPERLIPIDEMIA: ICD-10-CM

## 2020-04-08 DIAGNOSIS — I11.9 HYPERTENSIVE HEART DISEASE WITHOUT HEART FAILURE: Primary | ICD-10-CM

## 2020-04-08 PROCEDURE — 99203 OFFICE O/P NEW LOW 30 MIN: CPT | Performed by: INTERNAL MEDICINE

## 2020-07-10 ENCOUNTER — OFFICE VISIT (OUTPATIENT)
Dept: CARDIOLOGY | Facility: CLINIC | Age: 60
End: 2020-07-10

## 2020-07-10 VITALS
DIASTOLIC BLOOD PRESSURE: 78 MMHG | WEIGHT: 222.6 LBS | HEIGHT: 75 IN | SYSTOLIC BLOOD PRESSURE: 118 MMHG | OXYGEN SATURATION: 98 % | TEMPERATURE: 96.8 F | HEART RATE: 76 BPM | BODY MASS INDEX: 27.68 KG/M2

## 2020-07-10 DIAGNOSIS — E78.2 MIXED HYPERLIPIDEMIA: ICD-10-CM

## 2020-07-10 DIAGNOSIS — I77.810 AORTIC ROOT DILATION (HCC): ICD-10-CM

## 2020-07-10 DIAGNOSIS — I11.9 HYPERTENSIVE HEART DISEASE WITHOUT HEART FAILURE: ICD-10-CM

## 2020-07-10 DIAGNOSIS — I10 ESSENTIAL HYPERTENSION: ICD-10-CM

## 2020-07-10 DIAGNOSIS — R07.89 CHEST PAIN, ATYPICAL: Primary | ICD-10-CM

## 2020-07-10 PROCEDURE — 99214 OFFICE O/P EST MOD 30 MIN: CPT | Performed by: INTERNAL MEDICINE

## 2020-07-10 NOTE — PROGRESS NOTES
[facility]    Encounter Date: 07/10/2020    Patient ID: Aron Driscoll is a 59 y.o. male.  : 1960     PCP: Julia Moreau APRN       Chief Complaint: Hypertension      PROBLEM LIST:  1. Hypertensive heart disease:  a. Echocardiogram, 2020: EF 56-60% with mild concentric LVH. Mild-to-moderate MR, mild TR, RVSP < 35 mmHg.    2. First-degree AV block on ECG.  3. Palpitations:  a. 72-hour Holter, 2020: SR with 1.5% PACs and 0.2% PVCs. Rare short SVT  4. Hypertension.  5. Hyperlipidemia.  6. Aortic root dilation:  a. Echocardiogram, 2020: Mild dilation of the aortic root (4.2 cm)  7. Family history of heart disease.  8. Chronic kidney disease.    History of Present Illness  Patient presents today for a follow-up with a history of hypertensive heart disease, palpitations, and cardiac risk factors. Since last visit he has done quite well from cardiac standpoint.  States that initially he was experiencing palpitations but these have now resolved.  He has no shortness of breath.  Remains active and busy.  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  He reports occasional left-sided chest sensation which he describes as twinges which sometimes can last for hours.  These are not triggered by activities or exertion.  Sometimes he has a cold-like sensation on the left side of his chest.  He worries that this may be related to his heart.  He has no edema dizziness or syncope.    No Known Allergies      Current Outpatient Medications:   •  amLODIPine (NORVASC) 10 MG tablet, Take 1 tablet by mouth Daily., Disp: 90 tablet, Rfl: 3  •  atorvastatin (LIPITOR) 20 MG tablet, Take 1 tablet by mouth Daily., Disp: 90 tablet, Rfl: 2  •  dorzolamide-timolol (COSOPT) 22.3-6.8 MG/ML ophthalmic solution, instill 1 drop into both eyes twice a day, Disp: , Rfl: 1  •  latanoprost (XALATAN) 0.005 % ophthalmic solution, Administer 1 drop to both eyes every  "night at bedtime., Disp: , Rfl: 0  •  lisinopril-hydrochlorothiazide (PRINZIDE,ZESTORETIC) 20-25 MG per tablet, Take 1 tablet by mouth Daily., Disp: 90 tablet, Rfl: 3  •  sildenafil (VIAGRA) 100 MG tablet, Take 1 tablet by mouth Daily As Needed for Erectile Dysfunction., Disp: 9 tablet, Rfl: 11    The following portions of the patient's history were reviewed and updated as appropriate: allergies, current medications, past family history, past medical history, past social history, past surgical history and problem list.    ROS  Review of Systems   Constitution: Negative for chills, fever, fatigue, generalized weakness.   Cardiovascular: Negative for chest pain, claudication, dyspnea on exertion, leg swelling, palpitations, orthopnea, and syncope.   Respiratory: Negative for cough, shortness of breath, and wheezing.  HENT: Negative for ear pain, nosebleeds, and tinnitus.  Gastrointestinal: Negative for abdominal pain, constipation, diarrhea, nausea and vomiting.   Genitourinary: No urinary symptoms.  Musculoskeletal: Negative for muscle cramps.  Neurological: Negative for dizziness, headaches, loss of balance, numbness, and symptoms of stroke.  Psychiatric: Normal mental status.     All other systems reviewed and are negative.        Objective:     /78 (BP Location: Left arm, Patient Position: Sitting)   Pulse 76   Temp 96.8 °F (36 °C)   Ht 190.5 cm (75\")   Wt 101 kg (222 lb 9.6 oz)   SpO2 98%   BMI 27.82 kg/m²      Physical Exam  Constitutional: Patient appears well-developed and well-nourished.   HENT: HEENT exam unremarkable.   Neck: Neck supple. No JVD present. No carotid bruits.   Cardiovascular: Normal rate, regular rhythm and normal heart sounds. No murmur heard.   2+ symmetric pulses.   Pulmonary/Chest: Breath sounds normal. Does not exhibit tenderness.   Abdominal: Abdomen benign.   Musculoskeletal: Does not exhibit edema.   Neurological: Neurological exam unremarkable.   Vitals reviewed.    Lab " Review:   Lab Results   Component Value Date    GLUCOSE 86 02/13/2020    BUN 15 02/13/2020    CREATININE 1.22 02/13/2020    EGFRIFAFRI 74 02/13/2020    BCR 12.3 02/13/2020    K 3.8 02/13/2020    CO2 29.3 (H) 02/13/2020    CALCIUM 9.6 02/13/2020    ALBUMIN 4.50 02/13/2020    AST 17 02/13/2020    ALT 20 02/13/2020     Lab Results   Component Value Date    CHOL 112 02/13/2020    TRIG 61 02/13/2020    HDL 31 (L) 02/13/2020    LDL 69 02/13/2020      Lab Results   Component Value Date    WBC 3.97 02/13/2020    RBC 5.12 02/13/2020    HGB 14.5 02/13/2020    HCT 44.6 02/13/2020    MCV 87.1 02/13/2020     02/13/2020     Lab Results   Component Value Date    TSH 3.090 02/13/2020        Procedures       Assessment:      Diagnosis Plan   1. Chest pain, atypical      Stress Test With Myocardial Perfusion (1 Day)   2. Aortic root dilation (CMS/HCC)   echocardiogram with next follow-up visit, continue to manage blood pressure.   3. Essential hypertension   controlled, continue current medications.   4. Hypertensive heart disease without heart failure   no symptoms of CHF, continue to manage blood pressure.   5. Mixed hyperlipidemia   well-controlled, continue current statin therapy.     Plan:   Overall cardiac status is stable.  He has occasional chest pain which is atypical however due to his risk factors I have recommended Cardiolite stress test for further evaluation.  We talked about the benign nature of his cardiac monitor, he was advised to avoid caffeine and maintain good hydration.  No need for beta-blockers at this time as his symptoms have resolved.  We also talked about his aortic root dilatation and features of hypertensive heart disease on echo.  Continued management of hypertension and follow-up echocardiogram in 6 months recommended.  Continue current medications.   FU in 6 months with same-day echocardiogram, sooner as needed.  Thank you for allowing us to participate in the care of your patient.     Bel  MD Lyndsay, [facility], [facility]      Please note that portions of this note may have been completed with a voice recognition program. Efforts were made to edit the dictations, but occasionally words are mistranscribed.

## 2020-08-31 ENCOUNTER — APPOINTMENT (OUTPATIENT)
Dept: PREADMISSION TESTING | Facility: HOSPITAL | Age: 60
End: 2020-08-31

## 2020-08-31 ENCOUNTER — HOSPITAL ENCOUNTER (OUTPATIENT)
Dept: CARDIOLOGY | Facility: HOSPITAL | Age: 60
Discharge: HOME | End: 2020-08-31

## 2020-08-31 ENCOUNTER — HOSPITAL ENCOUNTER (OUTPATIENT)
Dept: CARDIOLOGY | Facility: HOSPITAL | Age: 60
Discharge: HOME | End: 2020-08-31
Admitting: INTERNAL MEDICINE

## 2020-08-31 VITALS — WEIGHT: 222 LBS | BODY MASS INDEX: 27.6 KG/M2 | HEIGHT: 75 IN

## 2020-08-31 VITALS
DIASTOLIC BLOOD PRESSURE: 90 MMHG | WEIGHT: 222 LBS | HEART RATE: 74 BPM | HEIGHT: 77 IN | BODY MASS INDEX: 26.21 KG/M2 | SYSTOLIC BLOOD PRESSURE: 130 MMHG

## 2020-08-31 DIAGNOSIS — R07.89 CHEST PAIN, ATYPICAL: ICD-10-CM

## 2020-08-31 LAB
BH CV ECHO MEAS - AO ROOT AREA (BSA CORRECTED): 1.9
BH CV ECHO MEAS - AO ROOT AREA: 14.6 CM^2
BH CV ECHO MEAS - AO ROOT DIAM: 4.3 CM
BH CV ECHO MEAS - BSA(HAYCOCK): 2.3 M^2
BH CV ECHO MEAS - BSA: 2.3 M^2
BH CV ECHO MEAS - BZI_BMI: 27.9 KILOGRAMS/M^2
BH CV ECHO MEAS - BZI_METRIC_HEIGHT: 190 CM
BH CV ECHO MEAS - BZI_METRIC_WEIGHT: 100.7 KG
BH CV ECHO MEAS - EDV(CUBED): 111.5 ML
BH CV ECHO MEAS - EDV(MOD-SP2): 148 ML
BH CV ECHO MEAS - EDV(MOD-SP4): 169 ML
BH CV ECHO MEAS - EDV(TEICH): 108.2 ML
BH CV ECHO MEAS - EF(CUBED): 75.5 %
BH CV ECHO MEAS - EF(MOD-BP): 50.2 %
BH CV ECHO MEAS - EF(MOD-SP2): 48.7 %
BH CV ECHO MEAS - EF(MOD-SP4): 50.2 %
BH CV ECHO MEAS - EF(TEICH): 67.3 %
BH CV ECHO MEAS - ESV(CUBED): 27.3 ML
BH CV ECHO MEAS - ESV(MOD-SP2): 75.9 ML
BH CV ECHO MEAS - ESV(MOD-SP4): 84.2 ML
BH CV ECHO MEAS - ESV(TEICH): 35.3 ML
BH CV ECHO MEAS - FS: 37.4 %
BH CV ECHO MEAS - IVS/LVPW: 1.1
BH CV ECHO MEAS - IVSD: 1.2 CM
BH CV ECHO MEAS - LA DIMENSION: 3.5 CM
BH CV ECHO MEAS - LA/AO: 0.81
BH CV ECHO MEAS - LAD MAJOR: 6.7 CM
BH CV ECHO MEAS - LAT PEAK E' VEL: 9.4 CM/SEC
BH CV ECHO MEAS - LATERAL E/E' RATIO: 7.4
BH CV ECHO MEAS - LV DIASTOLIC VOL/BSA (35-75): 73.8 ML/M^2
BH CV ECHO MEAS - LV MASS(C)D: 198 GRAMS
BH CV ECHO MEAS - LV MASS(C)DI: 86.5 GRAMS/M^2
BH CV ECHO MEAS - LV MAX PG: 2.4 MMHG
BH CV ECHO MEAS - LV MEAN PG: 0.95 MMHG
BH CV ECHO MEAS - LV SYSTOLIC VOL/BSA (12-30): 36.8 ML/M^2
BH CV ECHO MEAS - LV V1 MAX: 76.7 CM/SEC
BH CV ECHO MEAS - LV V1 MEAN: 43.9 CM/SEC
BH CV ECHO MEAS - LV V1 VTI: 15 CM
BH CV ECHO MEAS - LVIDD: 4.8 CM
BH CV ECHO MEAS - LVIDS: 3 CM
BH CV ECHO MEAS - LVLD AP2: 9.1 CM
BH CV ECHO MEAS - LVLD AP4: 9.8 CM
BH CV ECHO MEAS - LVLS AP2: 8.1 CM
BH CV ECHO MEAS - LVLS AP4: 7.8 CM
BH CV ECHO MEAS - LVOT AREA (M): 3.8 CM^2
BH CV ECHO MEAS - LVOT AREA: 3.7 CM^2
BH CV ECHO MEAS - LVOT DIAM: 2.2 CM
BH CV ECHO MEAS - LVPWD: 1.1 CM
BH CV ECHO MEAS - MED PEAK E' VEL: 7.4 CM/SEC
BH CV ECHO MEAS - MEDIAL E/E' RATIO: 9.3
BH CV ECHO MEAS - MR MAX PG: 118 MMHG
BH CV ECHO MEAS - MR MAX VEL: 543 CM/SEC
BH CV ECHO MEAS - MR MEAN PG: 68.6 MMHG
BH CV ECHO MEAS - MR MEAN VEL: 375.1 CM/SEC
BH CV ECHO MEAS - MR VTI: 237.6 CM
BH CV ECHO MEAS - MV A MAX VEL: 60.8 CM/SEC
BH CV ECHO MEAS - MV DEC SLOPE: 363.1 CM/SEC^2
BH CV ECHO MEAS - MV DEC TIME: 0.19 SEC
BH CV ECHO MEAS - MV E MAX VEL: 69 CM/SEC
BH CV ECHO MEAS - MV E/A: 1.1
BH CV ECHO MEAS - PI END-D VEL: 160.2 CM/SEC
BH CV ECHO MEAS - SI(CUBED): 36.8 ML/M^2
BH CV ECHO MEAS - SI(LVOT): 24.5 ML/M^2
BH CV ECHO MEAS - SI(MOD-SP2): 31.5 ML/M^2
BH CV ECHO MEAS - SI(MOD-SP4): 37 ML/M^2
BH CV ECHO MEAS - SI(TEICH): 31.8 ML/M^2
BH CV ECHO MEAS - SV(CUBED): 84.1 ML
BH CV ECHO MEAS - SV(LVOT): 56.1 ML
BH CV ECHO MEAS - SV(MOD-SP2): 72.1 ML
BH CV ECHO MEAS - SV(MOD-SP4): 84.8 ML
BH CV ECHO MEAS - SV(TEICH): 72.8 ML
BH CV ECHO MEASUREMENTS AVERAGE E/E' RATIO: 8.21
BH CV STRESS BP STAGE 2: NORMAL
BH CV STRESS BP STAGE 4: NORMAL
BH CV STRESS COMMENTS STAGE 1: NORMAL
BH CV STRESS DOSE REGADENOSON STAGE 1: 0.4
BH CV STRESS DURATION MIN STAGE 1: 1
BH CV STRESS DURATION MIN STAGE 2: 1
BH CV STRESS DURATION MIN STAGE 3: 1
BH CV STRESS DURATION MIN STAGE 4: 1
BH CV STRESS DURATION SEC STAGE 1: 10
BH CV STRESS DURATION SEC STAGE 2: 0
BH CV STRESS HR STAGE 1: 82
BH CV STRESS HR STAGE 2: 93
BH CV STRESS HR STAGE 3: 101
BH CV STRESS HR STAGE 4: 100
BH CV STRESS O2 STAGE 1: 100
BH CV STRESS O2 STAGE 2: 100
BH CV STRESS O2 STAGE 3: 100
BH CV STRESS O2 STAGE 4: 100
BH CV STRESS PROTOCOL 1: NORMAL
BH CV STRESS RECOVERY BP: NORMAL MMHG
BH CV STRESS RECOVERY HR: 83 BPM
BH CV STRESS STAGE 1: 1
BH CV STRESS STAGE 2: 2
BH CV STRESS STAGE 3: 3
BH CV STRESS STAGE 4: 4
BH CV VAS BP LEFT ARM: NORMAL MMHG
BH CV XLRA - RV BASE: 3.9 CM
BH CV XLRA - RV LENGTH: 5.7 CM
BH CV XLRA - RV MID: 2.7 CM
BH CV XLRA - TDI S': 8.6 CM/SEC
LV EF 2D ECHO EST: 55 %
LV EF NUC BP: 56 %
MAXIMAL PREDICTED HEART RATE: 162 BPM
MAXIMAL PREDICTED HEART RATE: 162 BPM
PERCENT MAX PREDICTED HR: 64.2 %
SARS-COV-2 RNA RESP QL NAA+PROBE: NOT DETECTED
STRESS BASELINE BP: NORMAL MMHG
STRESS BASELINE HR: 67 BPM
STRESS PERCENT HR: 76 %
STRESS POST PEAK BP: NORMAL MMHG
STRESS POST PEAK HR: 104 BPM
STRESS TARGET HR: 138 BPM
STRESS TARGET HR: 138 BPM

## 2020-08-31 PROCEDURE — 78452 HT MUSCLE IMAGE SPECT MULT: CPT | Performed by: INTERNAL MEDICINE

## 2020-08-31 PROCEDURE — A9500 TC99M SESTAMIBI: HCPCS | Performed by: INTERNAL MEDICINE

## 2020-08-31 PROCEDURE — 93017 CV STRESS TEST TRACING ONLY: CPT

## 2020-08-31 PROCEDURE — 87635 SARS-COV-2 COVID-19 AMP PRB: CPT | Performed by: INTERNAL MEDICINE

## 2020-08-31 PROCEDURE — 93306 TTE W/DOPPLER COMPLETE: CPT | Performed by: INTERNAL MEDICINE

## 2020-08-31 PROCEDURE — C9803 HOPD COVID-19 SPEC COLLECT: HCPCS

## 2020-08-31 PROCEDURE — 93018 CV STRESS TEST I&R ONLY: CPT | Performed by: INTERNAL MEDICINE

## 2020-08-31 PROCEDURE — 25010000002 REGADENOSON 0.4 MG/5ML SOLUTION: Performed by: INTERNAL MEDICINE

## 2020-08-31 PROCEDURE — 93306 TTE W/DOPPLER COMPLETE: CPT

## 2020-08-31 PROCEDURE — 0 TECHNETIUM SESTAMIBI: Performed by: INTERNAL MEDICINE

## 2020-08-31 PROCEDURE — 78452 HT MUSCLE IMAGE SPECT MULT: CPT

## 2020-08-31 RX ADMIN — TECHNETIUM TC 99M SESTAMIBI 1 DOSE: 1 INJECTION INTRAVENOUS at 12:05

## 2020-08-31 RX ADMIN — TECHNETIUM TC 99M SESTAMIBI 1 DOSE: 1 INJECTION INTRAVENOUS at 13:35

## 2020-08-31 RX ADMIN — REGADENOSON 0.4 MG: 0.08 INJECTION, SOLUTION INTRAVENOUS at 13:33

## 2020-09-01 ENCOUNTER — TELEPHONE (OUTPATIENT)
Dept: CARDIOLOGY | Facility: CLINIC | Age: 60
End: 2020-09-01

## 2020-09-01 NOTE — TELEPHONE ENCOUNTER
LVM for pt and informed him of these results and AA recommendations. Left call back number for any further questions or concerns.

## 2020-09-01 NOTE — TELEPHONE ENCOUNTER
----- Message from Bel Umana MD sent at 9/1/2020  6:40 AM EDT -----  His echocardiogram shows stable findings, aortic root is essentially same as before with no further enlargement.  We will continue to monitor.  Main focus should be good control of blood pressure.  Overall heart function is normal.  Keep scheduled appointment for follow-up and continue same treatment.

## 2020-09-01 NOTE — TELEPHONE ENCOUNTER
----- Message from Bel Umana MD sent at 9/1/2020  6:38 AM EDT -----  Please notify the patient that the stress test is normal, continue same treatment, keep scheduled appointment for follow-up.

## 2021-02-23 DIAGNOSIS — I10 ESSENTIAL HYPERTENSION: ICD-10-CM

## 2021-02-23 DIAGNOSIS — E78.01 FAMILIAL HYPERCHOLESTEROLEMIA: ICD-10-CM

## 2021-02-24 RX ORDER — LISINOPRIL AND HYDROCHLOROTHIAZIDE 25; 20 MG/1; MG/1
TABLET ORAL
Qty: 90 TABLET | Refills: 3 | OUTPATIENT
Start: 2021-02-24

## 2021-02-24 RX ORDER — ATORVASTATIN CALCIUM 20 MG/1
TABLET, FILM COATED ORAL
Qty: 90 TABLET | Refills: 2 | OUTPATIENT
Start: 2021-02-24

## 2021-02-24 RX ORDER — AMLODIPINE BESYLATE 10 MG/1
TABLET ORAL
Qty: 90 TABLET | Refills: 3 | OUTPATIENT
Start: 2021-02-24

## 2021-03-10 ENCOUNTER — OFFICE VISIT (OUTPATIENT)
Dept: FAMILY MEDICINE CLINIC | Facility: CLINIC | Age: 61
End: 2021-03-10

## 2021-03-10 VITALS
OXYGEN SATURATION: 100 % | HEIGHT: 77 IN | WEIGHT: 236 LBS | TEMPERATURE: 99.3 F | RESPIRATION RATE: 18 BRPM | SYSTOLIC BLOOD PRESSURE: 122 MMHG | HEART RATE: 84 BPM | BODY MASS INDEX: 27.87 KG/M2 | DIASTOLIC BLOOD PRESSURE: 78 MMHG

## 2021-03-10 DIAGNOSIS — N52.02 CORPORO-VENOUS OCCLUSIVE ERECTILE DYSFUNCTION: ICD-10-CM

## 2021-03-10 DIAGNOSIS — Z00.00 ANNUAL PHYSICAL EXAM: Primary | ICD-10-CM

## 2021-03-10 DIAGNOSIS — Z12.5 SCREENING FOR MALIGNANT NEOPLASM OF PROSTATE: ICD-10-CM

## 2021-03-10 DIAGNOSIS — I10 ESSENTIAL HYPERTENSION: ICD-10-CM

## 2021-03-10 DIAGNOSIS — E78.01 FAMILIAL HYPERCHOLESTEROLEMIA: ICD-10-CM

## 2021-03-10 DIAGNOSIS — Z76.0 MEDICATION REFILL: ICD-10-CM

## 2021-03-10 DIAGNOSIS — H40.9 GLAUCOMA OF BOTH EYES, UNSPECIFIED GLAUCOMA TYPE: ICD-10-CM

## 2021-03-10 PROCEDURE — 99396 PREV VISIT EST AGE 40-64: CPT | Performed by: NURSE PRACTITIONER

## 2021-03-10 RX ORDER — ATORVASTATIN CALCIUM 20 MG/1
20 TABLET, FILM COATED ORAL DAILY
Qty: 90 TABLET | Refills: 2 | Status: SHIPPED | OUTPATIENT
Start: 2021-03-10 | End: 2022-03-14 | Stop reason: SDUPTHER

## 2021-03-10 RX ORDER — DORZOLAMIDE HYDROCHLORIDE AND TIMOLOL MALEATE 20; 5 MG/ML; MG/ML
1 SOLUTION/ DROPS OPHTHALMIC 2 TIMES DAILY
Qty: 10 ML | Refills: 5 | Status: SHIPPED | OUTPATIENT
Start: 2021-03-10

## 2021-03-10 RX ORDER — AMLODIPINE BESYLATE 10 MG/1
10 TABLET ORAL DAILY
Qty: 90 TABLET | Refills: 3 | Status: SHIPPED | OUTPATIENT
Start: 2021-03-10 | End: 2022-05-18 | Stop reason: SDUPTHER

## 2021-03-10 RX ORDER — SILDENAFIL 100 MG/1
100 TABLET, FILM COATED ORAL DAILY PRN
Qty: 9 TABLET | Refills: 11 | Status: SHIPPED | OUTPATIENT
Start: 2021-03-10 | End: 2022-05-19

## 2021-03-10 RX ORDER — LATANOPROST 50 UG/ML
1 SOLUTION/ DROPS OPHTHALMIC
Qty: 7.5 ML | Refills: 5 | Status: SHIPPED | OUTPATIENT
Start: 2021-03-10 | End: 2022-05-19 | Stop reason: SDUPTHER

## 2021-03-10 RX ORDER — LISINOPRIL AND HYDROCHLOROTHIAZIDE 25; 20 MG/1; MG/1
1 TABLET ORAL DAILY
Qty: 90 TABLET | Refills: 3 | Status: SHIPPED | OUTPATIENT
Start: 2021-03-10 | End: 2021-07-16

## 2021-03-10 NOTE — PROGRESS NOTES
Follow Up Office Note     Patient Name: Aron Driscoll  : 1960   MRN: [id number]     Chief Complaint:    Chief Complaint   Patient presents with   • Annual Exam       History of Present Illness: Aron Driscoll is a 60 y.o. male who presents today for annual physical exam. Patient describes his overall health as good. He has no complaints or health concerns today. He is requesting refills on all of his routine medications. Patient has a history of HTN which has been stable and well-controlled on current medication. He is currently being followed by DR. Umana at [facility], [place], operated by [facility] for for management of atypical chest pain (resolved) and hypertensive heart disease without heart failure. Patient also has a history of CKD for which he is being followed by nephrology. Additionally, patient follows with ophthalmology for management glaucoma and urology for elevated PSA.        Subjective      Review of Systems:   Review of Systems   Constitutional: Negative.    HENT: Negative.    Respiratory: Negative.    Cardiovascular: Negative.    Gastrointestinal: Negative.    Genitourinary: Negative.    Musculoskeletal: Negative.    Skin: Negative.    Neurological: Negative.         Past Medical History:   Past Medical History:   Diagnosis Date   • CKD (chronic kidney disease) stage 3, GFR 30-59 ml/min (CMS/[facility])    • Glaucoma    • HLD (hyperlipidemia)    • HTN (hypertension)          Medications:     Current Outpatient Medications:   •  amLODIPine (NORVASC) 10 MG tablet, Take 1 tablet by mouth Daily., Disp: 90 tablet, Rfl: 3  •  atorvastatin (LIPITOR) 20 MG tablet, Take 1 tablet by mouth Daily., Disp: 90 tablet, Rfl: 2  •  dorzolamide-timolol (COSOPT) 22.3-6.8 MG/ML ophthalmic solution, Administer 1 drop to both eyes 2 (Two) Times a Day., Disp: 10 mL, Rfl: 5  •  latanoprost (XALATAN) 0.005 % ophthalmic solution, Administer 1 drop to both eyes every night at bedtime., Disp: 7.5 mL, Rfl: 5  •   "lisinopril-hydrochlorothiazide (PRINZIDE,ZESTORETIC) 20-25 MG per tablet, Take 1 tablet by mouth Daily., Disp: 90 tablet, Rfl: 3  •  sildenafil (Viagra) 100 MG tablet, Take 1 tablet by mouth Daily As Needed for Erectile Dysfunction., Disp: 9 tablet, Rfl: 11    Allergies:   No Known Allergies    PHQ-2/PHQ-9 Depression Screening 3/10/2021   Little interest or pleasure in doing things 0   Feeling down, depressed, or hopeless 0   Total Score 0     THEO-7  Feeling nervous, anxious or on edge: Not at all  Not being able to stop or control worrying: Not at all  Worrying too much about different things: Not at all  Trouble Relaxing: Not at all  Being so restless that it is hard to sit still: Not at all  Feeling afraid as if something awful might happen: Not at all  Becoming easily annoyed or irritable: Not at all  THEO 7 Total Score: 0  If you checked any problems, how difficult have these problems made it for you to do your work, take care of things at home, or get along with other people: Not difficult at all        Objective     Physical Exam:  Vital Signs:   Vitals:    03/10/21 1724   BP: 122/78   BP Location: Left arm   Patient Position: Sitting   Cuff Size: Adult   Pulse: 84   Resp: 18   Temp: 99.3 °F (37.4 °C)   SpO2: 100%   Weight: 107 kg (236 lb)   Height: 195 cm (76.77\")   PainSc: 0-No pain     Body mass index is 28.15 kg/m².     Physical Exam  Vitals and nursing note reviewed. Exam conducted with a chaperone present.   Constitutional:       General: He is not in acute distress.     Appearance: Normal appearance. He is well-developed and well-groomed. He is not ill-appearing, toxic-appearing or diaphoretic.   HENT:      Head: Normocephalic and atraumatic.      Right Ear: Tympanic membrane, ear canal and external ear normal.      Left Ear: Tympanic membrane, ear canal and external ear normal.      Nose: Nose normal.      Mouth/Throat:      Lips: Pink.      Mouth: Mucous membranes are moist.      Pharynx: Oropharynx " is clear. Uvula midline. No posterior oropharyngeal erythema.   Eyes:      Pupils: Pupils are equal, round, and reactive to light.   Neck:      Thyroid: No thyroid mass, thyromegaly or thyroid tenderness.   Cardiovascular:      Rate and Rhythm: Normal rate and regular rhythm.      Pulses: Normal pulses.      Heart sounds: Normal heart sounds, S1 normal and S2 normal. No murmur.   Pulmonary:      Effort: Pulmonary effort is normal. No respiratory distress.      Breath sounds: Normal breath sounds.   Abdominal:      General: Bowel sounds are normal. There is no distension.      Palpations: Abdomen is soft.      Tenderness: There is no abdominal tenderness.   Musculoskeletal:         General: Normal range of motion.      Cervical back: Normal range of motion and neck supple.      Right lower leg: No edema.      Left lower leg: No edema.   Lymphadenopathy:      Cervical: No cervical adenopathy.   Skin:     General: Skin is warm and dry.      Capillary Refill: Capillary refill takes 2 to 3 seconds.      Findings: No rash.   Neurological:      General: No focal deficit present.      Mental Status: He is alert and oriented to person, place, and time.   Psychiatric:         Attention and Perception: Attention and perception normal.         Mood and Affect: Mood and affect normal.         Speech: Speech normal.         Behavior: Behavior normal. Behavior is cooperative.         Thought Content: Thought content normal.         Cognition and Memory: Cognition and memory normal.         Judgment: Judgment normal.         Assessment / Plan      Assessment/Plan:   Diagnoses and all orders for this visit:    1. Annual physical exam (Primary)  -     Comprehensive Metabolic Panel; Future  -     TSH; Future  -     CBC Auto Differential; Future  -     Lipid Panel; Future  -     POC Urinalysis Dipstick, Automated; Future    2. Essential hypertension  -     amLODIPine (NORVASC) 10 MG tablet; Take 1 tablet by mouth Daily.  Dispense: 90  tablet; Refill: 3  -     lisinopril-hydrochlorothiazide (PRINZIDE,ZESTORETIC) 20-25 MG per tablet; Take 1 tablet by mouth Daily.  Dispense: 90 tablet; Refill: 3    3. Familial hypercholesterolemia  -     atorvastatin (LIPITOR) 20 MG tablet; Take 1 tablet by mouth Daily.  Dispense: 90 tablet; Refill: 2    4. Corporo-venous occlusive erectile dysfunction  -     sildenafil (Viagra) 100 MG tablet; Take 1 tablet by mouth Daily As Needed for Erectile Dysfunction.  Dispense: 9 tablet; Refill: 11    5. Glaucoma of both eyes, unspecified glaucoma type  -     dorzolamide-timolol (COSOPT) 22.3-6.8 MG/ML ophthalmic solution; Administer 1 drop to both eyes 2 (Two) Times a Day.  Dispense: 10 mL; Refill: 5  -     latanoprost (XALATAN) 0.005 % ophthalmic solution; Administer 1 drop to both eyes every night at bedtime.  Dispense: 7.5 mL; Refill: 5    6. Screening for malignant neoplasm of prostate  -     PSA Screen; Future    7. Medication refill  -     amLODIPine (NORVASC) 10 MG tablet; Take 1 tablet by mouth Daily.  Dispense: 90 tablet; Refill: 3  -     atorvastatin (LIPITOR) 20 MG tablet; Take 1 tablet by mouth Daily.  Dispense: 90 tablet; Refill: 2  -     lisinopril-hydrochlorothiazide (PRINZIDE,ZESTORETIC) 20-25 MG per tablet; Take 1 tablet by mouth Daily.  Dispense: 90 tablet; Refill: 3  -     sildenafil (Viagra) 100 MG tablet; Take 1 tablet by mouth Daily As Needed for Erectile Dysfunction.  Dispense: 9 tablet; Refill: 11  -     dorzolamide-timolol (COSOPT) 22.3-6.8 MG/ML ophthalmic solution; Administer 1 drop to both eyes 2 (Two) Times a Day.  Dispense: 10 mL; Refill: 5  -     latanoprost (XALATAN) 0.005 % ophthalmic solution; Administer 1 drop to both eyes every night at bedtime.  Dispense: 7.5 mL; Refill: 5           Follow Up:   PRN and annually      Discussed the nature of the medical condition(s) risks, complications, implications, management, safe and proper use of medications. Encouraged medication compliance, and  keeping scheduled follow up appointments with me and any other providers.      The patient is here for a health maintenance visit.  Currently, the patient consumes a healthy diet and has no routineexercise regimen, but states that he tries to remain physically active. Screening lab work is ordered.  Immunizations are declined today and vaccine education is provided.  Advice and education is given regarding nutrition, aerobic exercise, routine dental evaluations, routine eye exams, reproductive health, cardiovascular risk reduction, sunscreen use, self skin examination (annual dermatology evaluations) and seat belt use (general overall safety).  Further recommendations after lab evaluation.  Annual wellness evaluations recommended.        CHELSIE Egan  [facility] [place] [facility] [place]       Please note that portions of this note may have been completed with a voice recognition program. Efforts were made to edit the dictations, but occasionally words are mistranscribed.

## 2021-03-11 NOTE — PATIENT INSTRUCTIONS
Health Maintenance, Male  Adopting a healthy lifestyle and getting preventive care are important in promoting health and wellness. Ask your health care provider about:  · The right schedule for you to have regular tests and exams.  · Things you can do on your own to prevent diseases and keep yourself healthy.  What should I know about diet, weight, and exercise?  Eat a healthy diet    · Eat a diet that includes plenty of vegetables, fruits, low-fat dairy products, and lean protein.  · Do not eat a lot of foods that are high in solid fats, added sugars, or sodium.  Maintain a healthy weight  Body mass index (BMI) is a measurement that can be used to identify possible weight problems. It estimates body fat based on height and weight. Your health care provider can help determine your BMI and help you achieve or maintain a healthy weight.  Get regular exercise  Get regular exercise. This is one of the most important things you can do for your health. Most adults should:  · Exercise for at least 150 minutes each week. The exercise should increase your heart rate and make you sweat (moderate-intensity exercise).  · Do strengthening exercises at least twice a week. This is in addition to the moderate-intensity exercise.  · Spend less time sitting. Even light physical activity can be beneficial.  Watch cholesterol and blood lipids  Have your blood tested for lipids and cholesterol at 20 years of age, then have this test every 5 years.  You may need to have your cholesterol levels checked more often if:  · Your lipid or cholesterol levels are high.  · You are older than 40 years of age.  · You are at high risk for heart disease.  What should I know about cancer screening?  Many types of cancers can be detected early and may often be prevented. Depending on your health history and family history, you may need to have cancer screening at various ages. This may include screening for:  · Colorectal cancer.  · Prostate  cancer.  · Skin cancer.  · Lung cancer.  What should I know about heart disease, diabetes, and high blood pressure?  Blood pressure and heart disease  · High blood pressure causes heart disease and increases the risk of stroke. This is more likely to develop in people who have high blood pressure readings, are of  descent, or are overweight.  · Talk with your health care provider about your target blood pressure readings.  · Have your blood pressure checked:  ? Every 3-5 years if you are 18-39 years of age.  ? Every year if you are 40 years old or older.  · If you are between the ages of 65 and 75 and are a current or former smoker, ask your health care provider if you should have a one-time screening for abdominal aortic aneurysm (AAA).  Diabetes  Have regular diabetes screenings. This checks your fasting blood sugar level. Have the screening done:  · Once every three years after age 45 if you are at a normal weight and have a low risk for diabetes.  · More often and at a younger age if you are overweight or have a high risk for diabetes.  What should I know about preventing infection?  Hepatitis B  If you have a higher risk for hepatitis B, you should be screened for this virus. Talk with your health care provider to find out if you are at risk for hepatitis B infection.  Hepatitis C  Blood testing is recommended for:  · Everyone born from 1945 through 1965.  · Anyone with known risk factors for hepatitis C.  Sexually transmitted infections (STIs)  · You should be screened each year for STIs, including gonorrhea and chlamydia, if:  ? You are sexually active and are younger than 24 years of age.  ? You are older than 24 years of age and your health care provider tells you that you are at risk for this type of infection.  ? Your sexual activity has changed since you were last screened, and you are at increased risk for chlamydia or gonorrhea. Ask your health care provider if you are at risk.  · Ask your  health care provider about whether you are at high risk for HIV. Your health care provider may recommend a prescription medicine to help prevent HIV infection. If you choose to take medicine to prevent HIV, you should first get tested for HIV. You should then be tested every 3 months for as long as you are taking the medicine.  Follow these instructions at home:  Lifestyle  · Do not use any products that contain nicotine or tobacco, such as cigarettes, e-cigarettes, and chewing tobacco. If you need help quitting, ask your health care provider.  · Do not use street drugs.  · Do not share needles.  · Ask your health care provider for help if you need support or information about quitting drugs.  Alcohol use  · Do not drink alcohol if your health care provider tells you not to drink.  · If you drink alcohol:  ? Limit how much you have to 0-2 drinks a day.  ? Be aware of how much alcohol is in your drink. In the [place] one drink equals one 12 oz bottle of beer (355 mL), one 5 oz glass of wine (148 mL), or one 1½ oz glass of hard liquor (44 mL).  General instructions  · Schedule regular health, dental, and eye exams.  · Stay current with your vaccines.  · Tell your health care provider if:  ? You often feel depressed.  ? You have ever been abused or do not feel safe at home.  Summary  · Adopting a healthy lifestyle and getting preventive care are important in promoting health and wellness.  · Follow your health care provider's instructions about healthy diet, exercising, and getting tested or screened for diseases.  · Follow your health care provider's instructions on monitoring your cholesterol and blood pressure.  This information is not intended to replace advice given to you by your health care provider. Make sure you discuss any questions you have with your health care provider.  Document Revised: 12/11/2019 Document Reviewed: 12/11/2019  Elsevier Patient Education © 2020 [employer]    Preventive Care 40-64 Years  Old, Male  Preventive care refers to lifestyle choices and visits with your health care provider that can promote health and wellness. This includes:  · A yearly physical exam. This is also called an annual well check.  · Regular dental and eye exams.  · Immunizations.  · Screening for certain conditions.  · Healthy lifestyle choices, such as eating a healthy diet, getting regular exercise, not using drugs or products that contain nicotine and tobacco, and limiting alcohol use.  What can I expect for my preventive care visit?  Physical exam  Your health care provider will check:  · Height and weight. These may be used to calculate body mass index (BMI), which is a measurement that tells if you are at a healthy weight.  · Heart rate and blood pressure.  · Your skin for abnormal spots.  Counseling  Your health care provider may ask you questions about:  · Alcohol, tobacco, and drug use.  · Emotional well-being.  · Home and relationship well-being.  · Sexual activity.  · Eating habits.  · Work and work environment.  What immunizations do I need?    Influenza (flu) vaccine  · This is recommended every year.  Tetanus, diphtheria, and pertussis (Tdap) vaccine  · You may need a Td booster every 10 years.  Varicella (chickenpox) vaccine  · You may need this vaccine if you have not already been vaccinated.  Zoster (shingles) vaccine  · You may need this after age 60.  Measles, mumps, and rubella (MMR) vaccine  · You may need at least one dose of MMR if you were born in 1957 or later. You may also need a second dose.  Pneumococcal conjugate (PCV13) vaccine  · You may need this if you have certain conditions and were not previously vaccinated.  Pneumococcal polysaccharide (PPSV23) vaccine  · You may need one or two doses if you smoke cigarettes or if you have certain conditions.  Meningococcal conjugate (MenACWY) vaccine  · You may need this if you have certain conditions.  Hepatitis A vaccine  · You may need this if you have  certain conditions or if you travel or work in places where you may be exposed to hepatitis A.  Hepatitis B vaccine  · You may need this if you have certain conditions or if you travel or work in places where you may be exposed to hepatitis B.  Haemophilus influenzae type b (Hib) vaccine  · You may need this if you have certain risk factors.  Human papillomavirus (HPV) vaccine  · If recommended by your health care provider, you may need three doses over 6 months.  You may receive vaccines as individual doses or as more than one vaccine together in one shot (combination vaccines). Talk with your health care provider about the risks and benefits of combination vaccines.  What tests do I need?  Blood tests  · Lipid and cholesterol levels. These may be checked every 5 years, or more frequently if you are over 50 years old.  · Hepatitis C test.  · Hepatitis B test.  Screening  · Lung cancer screening. You may have this screening every year starting at age 55 if you have a 30-pack-year history of smoking and currently smoke or have quit within the past 15 years.  · Prostate cancer screening. Recommendations will vary depending on your family history and other risks.  · Colorectal cancer screening. All adults should have this screening starting at age 50 and continuing until age 75. Your health care provider may recommend screening at age 45 if you are at increased risk. You will have tests every 1-10 years, depending on your results and the type of screening test.  · Diabetes screening. This is done by checking your blood sugar (glucose) after you have not eaten for a while (fasting). You may have this done every 1-3 years.  · Sexually transmitted disease (STD) testing.  Follow these instructions at home:  Eating and drinking  · Eat a diet that includes fresh fruits and vegetables, whole grains, lean protein, and low-fat dairy products.  · Take vitamin and mineral supplements as recommended by your health care  provider.  · Do not drink alcohol if your health care provider tells you not to drink.  · If you drink alcohol:  ? Limit how much you have to 0-2 drinks a day.  ? Be aware of how much alcohol is in your drink. In the [place] one drink equals one 12 oz bottle of beer (355 mL), one 5 oz glass of wine (148 mL), or one 1½ oz glass of hard liquor (44 mL).  Lifestyle  · Take daily care of your teeth and gums.  · Stay active. Exercise for at least 30 minutes on 5 or more days each week.  · Do not use any products that contain nicotine or tobacco, such as cigarettes, e-cigarettes, and chewing tobacco. If you need help quitting, ask your health care provider.  · If you are sexually active, practice safe sex. Use a condom or other form of protection to prevent STIs (sexually transmitted infections).  · Talk with your health care provider about taking a low-dose aspirin every day starting at age 50.  What's next?  · Go to your health care provider once a year for a well check visit.  · Ask your health care provider how often you should have your eyes and teeth checked.  · Stay up to date on all vaccines.  This information is not intended to replace advice given to you by your health care provider. Make sure you discuss any questions you have with your health care provider.  Document Revised: 12/12/2019 Document Reviewed: 12/12/2019  [employer] Patient Education © 2020 [employer]    Managing Your Hypertension  Hypertension is commonly called high blood pressure. This is when the force of your blood pressing against the walls of your arteries is too strong. Arteries are blood vessels that carry blood from your heart throughout your body. Hypertension forces the heart to work harder to pump blood, and may cause the arteries to become narrow or stiff. Having untreated or uncontrolled hypertension can cause heart attack, stroke, kidney disease, and other problems.  What are blood pressure readings?  A blood pressure reading consists  "of a higher number over a lower number. Ideally, your blood pressure should be below 120/80. The first (\"top\") number is called the systolic pressure. It is a measure of the pressure in your arteries as your heart beats. The second (\"bottom\") number is called the diastolic pressure. It is a measure of the pressure in your arteries as the heart relaxes.  What does my blood pressure reading mean?  Blood pressure is classified into four stages. Based on your blood pressure reading, your health care provider may use the following stages to determine what type of treatment you need, if any. Systolic pressure and diastolic pressure are measured in a unit called mm Hg.  Normal  · Systolic pressure: below 120.  · Diastolic pressure: below 80.  Elevated  · Systolic pressure: 120-129.  · Diastolic pressure: below 80.  Hypertension stage 1  · Systolic pressure: 130-139.  · Diastolic pressure: 80-89.  Hypertension stage 2  · Systolic pressure: 140 or above.  · Diastolic pressure: 90 or above.  What health risks are associated with hypertension?  Managing your hypertension is an important responsibility. Uncontrolled hypertension can lead to:  · A heart attack.  · A stroke.  · A weakened blood vessel (aneurysm).  · Heart failure.  · Kidney damage.  · Eye damage.  · Metabolic syndrome.  · Memory and concentration problems.  What changes can I make to manage my hypertension?  Hypertension can be managed by making lifestyle changes and possibly by taking medicines. Your health care provider will help you make a plan to bring your blood pressure within a normal range.  Eating and drinking    · Eat a diet that is high in fiber and potassium, and low in salt (sodium), added sugar, and fat. An example eating plan is called the DASH (Dietary Approaches to Stop Hypertension) diet. To eat this way:  ? Eat plenty of fresh fruits and vegetables. Try to fill half of your plate at each meal with fruits and vegetables.  ? Eat whole grains, " such as whole wheat pasta, brown rice, or whole grain bread. Fill about one quarter of your plate with whole grains.  ? Eat low-fat diary products.  ? Avoid fatty cuts of meat, processed or cured meats, and poultry with skin. Fill about one quarter of your plate with lean proteins such as fish, chicken without skin, beans, eggs, and tofu.  ? Avoid premade and processed foods. These tend to be higher in sodium, added sugar, and fat.  · Reduce your daily sodium intake. Most people with hypertension should eat less than 1,500 mg of sodium a day.  · Limit alcohol intake to no more than 1 drink a day for nonpregnant women and 2 drinks a day for men. One drink equals 12 oz of beer, 5 oz of wine, or 1½ oz of hard liquor.  Lifestyle  · Work with your health care provider to maintain a healthy body weight, or to lose weight. Ask what an ideal weight is for you.  · Get at least 30 minutes of exercise that causes your heart to beat faster (aerobic exercise) most days of the week. Activities may include walking, swimming, or biking.  · Include exercise to strengthen your muscles (resistance exercise), such as weight lifting, as part of your weekly exercise routine. Try to do these types of exercises for 30 minutes at least 3 days a week.  · Do not use any products that contain nicotine or tobacco, such as cigarettes and e-cigarettes. If you need help quitting, ask your health care provider.  · Control any long-term (chronic) conditions you have, such as high cholesterol or diabetes.  Monitoring  · Monitor your blood pressure at home as told by your health care provider. Your personal target blood pressure may vary depending on your medical conditions, your age, and other factors.  · Have your blood pressure checked regularly, as often as told by your health care provider.  Working with your health care provider  · Review all the medicines you take with your health care provider because there may be side effects or  interactions.  · Talk with your health care provider about your diet, exercise habits, and other lifestyle factors that may be contributing to hypertension.  · Visit your health care provider regularly. Your health care provider can help you create and adjust your plan for managing hypertension.  Will I need medicine to control my blood pressure?  Your health care provider may prescribe medicine if lifestyle changes are not enough to get your blood pressure under control, and if:  · Your systolic blood pressure is 130 or higher.  · Your diastolic blood pressure is 80 or higher.  Take medicines only as told by your health care provider. Follow the directions carefully. Blood pressure medicines must be taken as prescribed. The medicine does not work as well when you skip doses. Skipping doses also puts you at risk for problems.  Contact a health care provider if:  · You think you are having a reaction to medicines you have taken.  · You have repeated (recurrent) headaches.  · You feel dizzy.  · You have swelling in your ankles.  · You have trouble with your vision.  Get help right away if:  · You develop a severe headache or confusion.  · You have unusual weakness or numbness, or you feel faint.  · You have severe pain in your chest or abdomen.  · You vomit repeatedly.  · You have trouble breathing.  Summary  · Hypertension is when the force of blood pumping through your arteries is too strong. If this condition is not controlled, it may put you at risk for serious complications.  · Your personal target blood pressure may vary depending on your medical conditions, your age, and other factors. For most people, a normal blood pressure is less than 120/80.  · Hypertension is managed by lifestyle changes, medicines, or both. Lifestyle changes include weight loss, eating a healthy, low-sodium diet, exercising more, and limiting alcohol.  This information is not intended to replace advice given to you by your health care  provider. Make sure you discuss any questions you have with your health care provider.  Document Revised: 04/10/2020 Document Reviewed: 11/15/2017  [employer] Patient Education © 2020 [employer]    Dyslipidemia  Dyslipidemia is an imbalance of waxy, fat-like substances (lipids) in the blood. The body needs lipids in small amounts. Dyslipidemia often involves a high level of cholesterol or triglycerides, which are types of lipids.  Common forms of dyslipidemia include:  · High levels of LDL cholesterol. LDL is the type of cholesterol that causes fatty deposits (plaques) to build up in the blood vessels that carry blood away from your heart (arteries).  · Low levels of HDL cholesterol. HDL cholesterol is the type of cholesterol that protects against heart disease. High levels of HDL remove the LDL buildup from arteries.  · High levels of triglycerides. Triglycerides are a fatty substance in the blood that is linked to a buildup of plaques in the arteries.  What are the causes?  Primary dyslipidemia is caused by changes (mutations) in genes that are passed down through families (inherited). These mutations cause several types of dyslipidemia.  Secondary dyslipidemia is caused by lifestyle choices and diseases that lead to dyslipidemia, such as:  · Eating a diet that is high in animal fat.  · Not getting enough exercise.  · Having diabetes, kidney disease, liver disease, or thyroid disease.  · Drinking large amounts of alcohol.  · Using certain medicines.  What increases the risk?  You are more likely to develop this condition if you are an older man or if you are a woman who has gone through menopause. Other risk factors include:  · Having a family history of dyslipidemia.  · Taking certain medicines, including birth control pills, steroids, some diuretics, and beta-blockers.  · Smoking cigarettes.  · Eating a high-fat diet.  · Having certain medical conditions such as diabetes, polycystic ovary syndrome (PCOS),  kidney disease, liver disease, or hypothyroidism.  · Not exercising regularly.  · Being overweight or obese with too much belly fat.  What are the signs or symptoms?  In most cases, dyslipidemia does not usually cause any symptoms.  In severe cases, very high lipid levels can cause:  · Fatty bumps under the skin (xanthomas).  · White or gray ring around the black center (pupil) of the eye.  Very high triglyceride levels can cause inflammation of the pancreas (pancreatitis).  How is this diagnosed?  Your health care provider may diagnose dyslipidemia based on a routine blood test (fasting blood test). Because most people do not have symptoms of the condition, this blood testing (lipid profile) is done on adults age 20 and older and is repeated every 5 years. This test checks:  · Total cholesterol. This measures the total amount of cholesterol in your blood, including LDL cholesterol, HDL cholesterol, and triglycerides. A healthy number is below 200.  · LDL cholesterol. The target number for LDL cholesterol is different for each person, depending on individual risk factors. Ask your health care provider what your LDL cholesterol should be.  · HDL cholesterol. An HDL level of 60 or higher is best because it helps to protect against heart disease. A number below 40 for men or below 50 for women increases the risk for heart disease.  · Triglycerides. A healthy triglyceride number is below 150.  If your lipid profile is abnormal, your health care provider may do other blood tests.  How is this treated?  Treatment depends on the type of dyslipidemia that you have and your other risk factors for heart disease and stroke. Your health care provider will have a target range for your lipid levels based on this information.  For many people, this condition may be treated by lifestyle changes, such as diet and exercise. Your health care provider may recommend that you:  · Get regular exercise.  · Make changes to your  diet.  · Quit smoking if you smoke.  If diet changes and exercise do not help you reach your goals, your health care provider may also prescribe medicine to lower lipids. The most commonly prescribed type of medicine lowers your LDL cholesterol (statin drug). If you have a high triglyceride level, your provider may prescribe another type of drug (fibrate) or an omega-3 fish oil supplement, or both.  Follow these instructions at home:    Eating and drinking  · Follow instructions from your health care provider or dietitian about eating or drinking restrictions.  · Eat a healthy diet as told by your health care provider. This can help you reach and maintain a healthy weight, lower your LDL cholesterol, and raise your HDL cholesterol. This may include:  ? Limiting your calories, if you are overweight.  ? Eating more fruits, vegetables, whole grains, fish, and lean meats.  ? Limiting saturated fat, trans fat, and cholesterol.  · If you drink alcohol:  ? Limit how much you use.  ? Be aware of how much alcohol is in your drink. In the [place] one drink equals one 12 oz bottle of beer (355 mL), one 5 oz glass of wine (148 mL), or one 1½ oz glass of hard liquor (44 mL).  · Do not drink alcohol if:  ? Your health care provider tells you not to drink.  ? You are pregnant, may be pregnant, or are planning to become pregnant.  Activity  · Get regular exercise. Start an exercise and strength training program as told by your health care provider. Ask your health care provider what activities are safe for you. Your health care provider may recommend:  ? 30 minutes of aerobic activity 4-6 days a week. Brisk walking is an example of aerobic activity.  ? Strength training 2 days a week.  General instructions  · Do not use any products that contain nicotine or tobacco, such as cigarettes, e-cigarettes, and chewing tobacco. If you need help quitting, ask your health care provider.  · Take over-the-counter and prescription medicines only  as told by your health care provider. This includes supplements.  · Keep all follow-up visits as told by your health care provider.  Contact a health care provider if:  · You are:  ? Having trouble sticking to your exercise or diet plan.  ? Struggling to quit smoking or control your use of alcohol.  Summary  · Dyslipidemia often involves a high level of cholesterol or triglycerides, which are types of lipids.  · Treatment depends on the type of dyslipidemia that you have and your other risk factors for heart disease and stroke.  · For many people, treatment starts with lifestyle changes, such as diet and exercise.  · Your health care provider may prescribe medicine to lower lipids.  This information is not intended to replace advice given to you by your health care provider. Make sure you discuss any questions you have with your health care provider.  Document Revised: 08/12/2019 Document Reviewed: 07/19/2019  [employer] Patient Education © 2020 [employer]

## 2021-04-20 ENCOUNTER — LAB (OUTPATIENT)
Dept: FAMILY MEDICINE CLINIC | Facility: CLINIC | Age: 61
End: 2021-04-20

## 2021-04-20 DIAGNOSIS — Z12.5 SCREENING FOR MALIGNANT NEOPLASM OF PROSTATE: ICD-10-CM

## 2021-04-20 DIAGNOSIS — Z00.00 ANNUAL PHYSICAL EXAM: ICD-10-CM

## 2021-04-20 LAB
DEPRECATED RDW RBC AUTO: 40.1 FL (ref 37–54)
ERYTHROCYTE [DISTWIDTH] IN BLOOD BY AUTOMATED COUNT: 13.3 % (ref 12.3–15.4)
HCT VFR BLD AUTO: 42.5 % (ref 37.5–51)
HGB BLD-MCNC: 14.2 G/DL (ref 13–17.7)
MCH RBC QN AUTO: 28.1 PG (ref 26.6–33)
MCHC RBC AUTO-ENTMCNC: 33.4 G/DL (ref 31.5–35.7)
MCV RBC AUTO: 84 FL (ref 79–97)
PLATELET # BLD AUTO: 205 10*3/MM3 (ref 140–450)
PMV BLD AUTO: 10.8 FL (ref 6–12)
RBC # BLD AUTO: 5.06 10*6/MM3 (ref 4.14–5.8)
WBC # BLD AUTO: 4.08 10*3/MM3 (ref 3.4–10.8)

## 2021-04-20 PROCEDURE — 80053 COMPREHEN METABOLIC PANEL: CPT | Performed by: NURSE PRACTITIONER

## 2021-04-20 PROCEDURE — 85007 BL SMEAR W/DIFF WBC COUNT: CPT

## 2021-04-20 PROCEDURE — 85025 COMPLETE CBC W/AUTO DIFF WBC: CPT | Performed by: NURSE PRACTITIONER

## 2021-04-20 PROCEDURE — G0103 PSA SCREENING: HCPCS | Performed by: NURSE PRACTITIONER

## 2021-04-20 PROCEDURE — 36415 COLL VENOUS BLD VENIPUNCTURE: CPT | Performed by: NURSE PRACTITIONER

## 2021-04-20 PROCEDURE — 80061 LIPID PANEL: CPT | Performed by: NURSE PRACTITIONER

## 2021-04-20 PROCEDURE — 84443 ASSAY THYROID STIM HORMONE: CPT | Performed by: NURSE PRACTITIONER

## 2021-04-21 LAB
ALBUMIN SERPL-MCNC: 4.1 G/DL (ref 3.5–5.2)
ALBUMIN/GLOB SERPL: 1.5 G/DL
ALP SERPL-CCNC: 57 U/L (ref 39–117)
ALT SERPL W P-5'-P-CCNC: 20 U/L (ref 1–41)
ANION GAP SERPL CALCULATED.3IONS-SCNC: 7.8 MMOL/L (ref 5–15)
AST SERPL-CCNC: 17 U/L (ref 1–40)
BILIRUB SERPL-MCNC: 0.3 MG/DL (ref 0–1.2)
BUN SERPL-MCNC: 11 MG/DL (ref 6–20)
BUN/CREAT SERPL: 8.1 (ref 7–25)
CALCIUM SPEC-SCNC: 9.1 MG/DL (ref 8.6–10.5)
CHLORIDE SERPL-SCNC: 107 MMOL/L (ref 98–107)
CHOLEST SERPL-MCNC: 113 MG/DL (ref 0–200)
CO2 SERPL-SCNC: 27.2 MMOL/L (ref 22–29)
CREAT SERPL-MCNC: 1.35 MG/DL (ref 0.76–1.27)
EOSINOPHIL # BLD MANUAL: 0.04 10*3/MM3 (ref 0–0.4)
EOSINOPHIL NFR BLD MANUAL: 1 % (ref 0.3–6.2)
GFR SERPL CREATININE-BSD FRML MDRD: 66 ML/MIN/1.73
GLOBULIN UR ELPH-MCNC: 2.7 GM/DL
GLUCOSE SERPL-MCNC: 81 MG/DL (ref 65–99)
HDLC SERPL-MCNC: 27 MG/DL (ref 40–60)
LDLC SERPL CALC-MCNC: 73 MG/DL (ref 0–100)
LDLC/HDLC SERPL: 2.75 {RATIO}
LYMPHOCYTES # BLD MANUAL: 2.47 10*3/MM3 (ref 0.7–3.1)
LYMPHOCYTES NFR BLD MANUAL: 4 % (ref 5–12)
LYMPHOCYTES NFR BLD MANUAL: 60.6 % (ref 19.6–45.3)
MONOCYTES # BLD AUTO: 0.16 10*3/MM3 (ref 0.1–0.9)
NEUTROPHILS # BLD AUTO: 1.4 10*3/MM3 (ref 1.7–7)
NEUTROPHILS NFR BLD MANUAL: 34.3 % (ref 42.7–76)
PLAT MORPH BLD: NORMAL
POTASSIUM SERPL-SCNC: 4.5 MMOL/L (ref 3.5–5.2)
PROT SERPL-MCNC: 6.8 G/DL (ref 6–8.5)
PSA SERPL-MCNC: 5.46 NG/ML (ref 0–4)
RBC MORPH BLD: NORMAL
SODIUM SERPL-SCNC: 142 MMOL/L (ref 136–145)
TRIGL SERPL-MCNC: 59 MG/DL (ref 0–150)
TSH SERPL DL<=0.05 MIU/L-ACNC: 2.03 UIU/ML (ref 0.27–4.2)
VLDLC SERPL-MCNC: 13 MG/DL (ref 5–40)
WBC MORPH BLD: NORMAL

## 2021-07-15 NOTE — PROGRESS NOTES
[facility] Cardiology    Encounter Date: 2021    Patient ID: Aron Driscoll is a 60 y.o. male.  : 1960     PCP: Julia Moreau APRN       Chief Complaint: Chest pain, atypical and Hypertensive heart disease without heart failure      PROBLEM LIST:  1. Hypertensive heart disease:  a. Echocardiogram, 2020: EF 56-60% with mild concentric LVH. Mild-to-moderate MR, mild TR, RVSP < 35 mmHg.    b. Echocardiogram, 2020: EF 55%. Mild to moderate MR. Mild TR. Mild dilation of aortic root.   c. Cardiolite, 2020: EF 56%. No evidence of ischemia. Low risk study.   2. First-degree AV block on ECG.  3. Palpitations:  a. 72-hour Holter, 2020: SR with 1.5% PACs and 0.2% PVCs. Rare short SVT  4. Hypertension.  5. Hyperlipidemia.  6. Aortic root dilation:  a. Echocardiogram, 2020: Mild dilation of the aortic root (4.2 cm)  7. Family history of heart disease.  8. Chronic kidney disease, stage II    History of Present Illness  Patient presents today for a follow-up with a history of aortic root dilation, hypertensive heart disease, and cardiac risk factors. Since last visit, the patient has been doing well overall from a cardiovascular standpoint. Patient denies chest pain, shortness of breath, palpitations, edema, dizziness, and syncope. He works in a healthcare facility. He does not have a regular exercise routine but states that he gets plenty of exercise regularly that were done around the house.  He has not used tobacco for the last 10-15 years. His systolic blood pressure historically has been between 120-130 torr.    No Known Allergies      Current Outpatient Medications:   •  amLODIPine (NORVASC) 10 MG tablet, Take 1 tablet by mouth Daily., Disp: 90 tablet, Rfl: 3  •  atorvastatin (LIPITOR) 20 MG tablet, Take 1 tablet by mouth Daily., Disp: 90 tablet, Rfl: 2  •  dorzolamide-timolol (COSOPT) 22.3-6.8 MG/ML ophthalmic solution, Administer 1 drop to  "both eyes 2 (Two) Times a Day., Disp: 10 mL, Rfl: 5  •  latanoprost (XALATAN) 0.005 % ophthalmic solution, Administer 1 drop to both eyes every night at bedtime., Disp: 7.5 mL, Rfl: 5  •  lisinopril (PRINIVIL,ZESTRIL) 20 MG tablet, Take 1 tablet by mouth Daily., Disp: 90 tablet, Rfl: 3  •  sildenafil (Viagra) 100 MG tablet, Take 1 tablet by mouth Daily As Needed for Erectile Dysfunction., Disp: 9 tablet, Rfl: 11    The following portions of the patient's history were reviewed and updated as appropriate: allergies, current medications, past family history, past medical history, past social history, past surgical history and problem list.    ROS  Review of Systems   Constitution: Negative for chills, fever, fatigue, generalized weakness.   Cardiovascular: Negative for chest pain, dyspnea on exertion, leg swelling, palpitations, orthopnea, and syncope.   Respiratory: Negative for cough, shortness of breath, and wheezing.  HENT: Negative for ear pain, nosebleeds, and tinnitus.  Gastrointestinal: Negative for abdominal pain, constipation, diarrhea, nausea and vomiting.   Genitourinary: No urinary symptoms.  Musculoskeletal: Negative for muscle cramps.  Neurological: Negative for dizziness, headaches, loss of balance, numbness, and symptoms of stroke.  Psychiatric: Normal mental status.     All other systems reviewed and are negative.        Objective:     /74 (BP Location: Left arm, Patient Position: Sitting)   Pulse 94   Ht 193 cm (76\")   Wt 105 kg (232 lb)   SpO2 96%   BMI 28.24 kg/m²    BP repeat: 110/70    Physical Exam  Constitutional: Patient appears well-developed and well-nourished.   HENT: HEENT exam unremarkable.   Neck: Neck supple. No JVD present. No carotid bruits.   Cardiovascular: Normal rate, regular rhythm and normal heart sounds. No murmur heard.   2+ symmetric pulses.   Pulmonary/Chest: Breath sounds normal. Does not exhibit tenderness.   Abdominal: Abdomen benign.   Musculoskeletal: Does " not exhibit edema.   Neurological: Neurological exam unremarkable.   Vitals reviewed.    Data Review:   Lab Results   Component Value Date    GLUCOSE 81 04/20/2021    BUN 11 04/20/2021    CREATININE 1.35 (H) 04/20/2021    EGFRIFAFRI 66 04/20/2021    BCR 8.1 04/20/2021    K 4.5 04/20/2021    CO2 27.2 04/20/2021    CALCIUM 9.1 04/20/2021    ALBUMIN 4.10 04/20/2021    AST 17 04/20/2021    ALT 20 04/20/2021     Lab Results   Component Value Date    CHOL 113 04/20/2021    TRIG 59 04/20/2021    HDL 27 (L) 04/20/2021    LDL 73 04/20/2021      Lab Results   Component Value Date    WBC 4.08 04/20/2021    RBC 5.06 04/20/2021    HGB 14.2 04/20/2021    HCT 42.5 04/20/2021    MCV 84.0 04/20/2021     04/20/2021     Lab Results   Component Value Date    TSH 2.030 04/20/2021          Procedures       Assessment:      Diagnosis Plan   1.   History of chest pain, atypical  No recurrent anginal symptoms; last cardiolite stress test normal.    2. Hypertensive heart disease without heart failure  Stable without CHF symptoms; continue current medications, blood pressure is well controlled.   3. Aortic root dilation (CMS/HCC)  Mild dilated on echo from 8/2020   4. Essential hypertension  Due to low blood pressure and elevated creatinine discontinue lisinopril-HCTZ and begin lisinopril 20 mg daily. Continue amlodipine.   5. Mixed hyperlipidemia  Well controlled; continue atorvastatin      Plan:   Due to blood pressure being on the lower side and elevated creatinine discontinue lisinopril-HCTZ and begin lisinopril 20 mg daily only, we are removing the diuretic component.  Patient will follow up with PCP for reassessment and monitoring of renal function.  Maintenance of good hydration recommended.    Continue all other current medications.   FU in 12 MO, sooner as needed.  We will consider repeating an echocardiogram at next visit.  Thank you for allowing us to participate in the care of your patient.       Scribed for Bel Umana MD  by Mireya Cabello. 7/16/2021 12:40 EDT      I, Bel Umana MD, personally performed the services described in this documentation as scribed by the above named individual in my presence, and it is both accurate and complete.  7/21/2021  06:35 EDT        Please note that portions of this note may have been completed with a voice recognition program. Efforts were made to edit the dictations, but occasionally words are mistranscribed.

## 2021-07-16 ENCOUNTER — OFFICE VISIT (OUTPATIENT)
Dept: CARDIOLOGY | Facility: CLINIC | Age: 61
End: 2021-07-16

## 2021-07-16 VITALS
SYSTOLIC BLOOD PRESSURE: 110 MMHG | OXYGEN SATURATION: 96 % | WEIGHT: 232 LBS | HEIGHT: 76 IN | HEART RATE: 94 BPM | BODY MASS INDEX: 28.25 KG/M2 | DIASTOLIC BLOOD PRESSURE: 74 MMHG

## 2021-07-16 DIAGNOSIS — I77.810 AORTIC ROOT DILATION (HCC): ICD-10-CM

## 2021-07-16 DIAGNOSIS — E78.2 MIXED HYPERLIPIDEMIA: ICD-10-CM

## 2021-07-16 DIAGNOSIS — I10 ESSENTIAL HYPERTENSION: ICD-10-CM

## 2021-07-16 DIAGNOSIS — I11.9 HYPERTENSIVE HEART DISEASE WITHOUT HEART FAILURE: ICD-10-CM

## 2021-07-16 DIAGNOSIS — R07.89 CHEST PAIN, ATYPICAL: Primary | ICD-10-CM

## 2021-07-16 PROCEDURE — 99214 OFFICE O/P EST MOD 30 MIN: CPT | Performed by: INTERNAL MEDICINE

## 2021-07-16 RX ORDER — LISINOPRIL 20 MG/1
20 TABLET ORAL DAILY
Qty: 90 TABLET | Refills: 3 | Status: SHIPPED | OUTPATIENT
Start: 2021-07-16 | End: 2022-06-03 | Stop reason: SDUPTHER

## 2022-03-14 DIAGNOSIS — E78.01 FAMILIAL HYPERCHOLESTEROLEMIA: ICD-10-CM

## 2022-03-14 DIAGNOSIS — Z76.0 MEDICATION REFILL: ICD-10-CM

## 2022-03-14 RX ORDER — ATORVASTATIN CALCIUM 20 MG/1
20 TABLET, FILM COATED ORAL DAILY
Qty: 90 TABLET | Refills: 2 | Status: SHIPPED | OUTPATIENT
Start: 2022-03-14 | End: 2022-03-16 | Stop reason: SDUPTHER

## 2022-03-16 ENCOUNTER — OFFICE VISIT (OUTPATIENT)
Dept: FAMILY MEDICINE CLINIC | Facility: CLINIC | Age: 62
End: 2022-03-16

## 2022-03-16 ENCOUNTER — LAB (OUTPATIENT)
Dept: LAB | Facility: HOSPITAL | Age: 62
End: 2022-03-16

## 2022-03-16 VITALS
HEIGHT: 76 IN | TEMPERATURE: 98 F | BODY MASS INDEX: 28.25 KG/M2 | OXYGEN SATURATION: 98 % | WEIGHT: 232 LBS | DIASTOLIC BLOOD PRESSURE: 90 MMHG | SYSTOLIC BLOOD PRESSURE: 122 MMHG | RESPIRATION RATE: 20 BRPM | HEART RATE: 89 BPM

## 2022-03-16 DIAGNOSIS — Z23 NEED FOR SHINGLES VACCINE: ICD-10-CM

## 2022-03-16 DIAGNOSIS — Z23 NEED FOR INFLUENZA VACCINATION: ICD-10-CM

## 2022-03-16 DIAGNOSIS — N18.30 STAGE 3 CHRONIC KIDNEY DISEASE, UNSPECIFIED WHETHER STAGE 3A OR 3B CKD: ICD-10-CM

## 2022-03-16 DIAGNOSIS — E78.2 MIXED HYPERLIPIDEMIA: ICD-10-CM

## 2022-03-16 DIAGNOSIS — M79.18 MUSCULOSKELETAL PAIN: ICD-10-CM

## 2022-03-16 DIAGNOSIS — I10 PRIMARY HYPERTENSION: ICD-10-CM

## 2022-03-16 DIAGNOSIS — Z00.00 ANNUAL PHYSICAL EXAM: ICD-10-CM

## 2022-03-16 DIAGNOSIS — Z76.0 MEDICATION REFILL: ICD-10-CM

## 2022-03-16 DIAGNOSIS — Z00.00 ANNUAL PHYSICAL EXAM: Primary | ICD-10-CM

## 2022-03-16 DIAGNOSIS — E78.01 FAMILIAL HYPERCHOLESTEROLEMIA: ICD-10-CM

## 2022-03-16 PROBLEM — H11.053 PERIPHERAL PTERYGIUM, PROGRESSIVE, BILATERAL: Status: ACTIVE | Noted: 2018-04-12

## 2022-03-16 PROBLEM — H25.13 AGE-RELATED NUCLEAR CATARACT OF BOTH EYES: Status: ACTIVE | Noted: 2018-05-24

## 2022-03-16 PROBLEM — H40.1123 PRIMARY OPEN ANGLE GLAUCOMA OF LEFT EYE, SEVERE STAGE: Status: ACTIVE | Noted: 2018-04-26

## 2022-03-16 LAB
ALBUMIN SERPL-MCNC: 4.8 G/DL (ref 3.5–5.2)
ALBUMIN/GLOB SERPL: 1.6 G/DL
ALP SERPL-CCNC: 69 U/L (ref 39–117)
ALT SERPL W P-5'-P-CCNC: 27 U/L (ref 1–41)
ANION GAP SERPL CALCULATED.3IONS-SCNC: 10.4 MMOL/L (ref 5–15)
AST SERPL-CCNC: 22 U/L (ref 1–40)
BASOPHILS # BLD AUTO: 0.02 10*3/MM3 (ref 0–0.2)
BASOPHILS NFR BLD AUTO: 0.4 % (ref 0–1.5)
BILIRUB SERPL-MCNC: 0.2 MG/DL (ref 0–1.2)
BUN SERPL-MCNC: 11 MG/DL (ref 8–23)
BUN/CREAT SERPL: 7.6 (ref 7–25)
CALCIUM SPEC-SCNC: 10.2 MG/DL (ref 8.6–10.5)
CHLORIDE SERPL-SCNC: 102 MMOL/L (ref 98–107)
CHOLEST SERPL-MCNC: 210 MG/DL (ref 0–200)
CO2 SERPL-SCNC: 27.6 MMOL/L (ref 22–29)
CREAT SERPL-MCNC: 1.45 MG/DL (ref 0.76–1.27)
DEPRECATED RDW RBC AUTO: 41 FL (ref 37–54)
EGFRCR SERPLBLD CKD-EPI 2021: 55.2 ML/MIN/1.73
EOSINOPHIL # BLD AUTO: 0.04 10*3/MM3 (ref 0–0.4)
EOSINOPHIL NFR BLD AUTO: 0.9 % (ref 0.3–6.2)
ERYTHROCYTE [DISTWIDTH] IN BLOOD BY AUTOMATED COUNT: 14.2 % (ref 12.3–15.4)
GLOBULIN UR ELPH-MCNC: 3 GM/DL
GLUCOSE SERPL-MCNC: 85 MG/DL (ref 65–99)
HCT VFR BLD AUTO: 48.3 % (ref 37.5–51)
HDLC SERPL-MCNC: 32 MG/DL (ref 40–60)
HGB BLD-MCNC: 16 G/DL (ref 13–17.7)
IMM GRANULOCYTES # BLD AUTO: 0 10*3/MM3 (ref 0–0.05)
IMM GRANULOCYTES NFR BLD AUTO: 0 % (ref 0–0.5)
LDLC SERPL CALC-MCNC: 154 MG/DL (ref 0–100)
LDLC/HDLC SERPL: 4.74 {RATIO}
LYMPHOCYTES # BLD AUTO: 2.39 10*3/MM3 (ref 0.7–3.1)
LYMPHOCYTES NFR BLD AUTO: 53.2 % (ref 19.6–45.3)
MCH RBC QN AUTO: 27.3 PG (ref 26.6–33)
MCHC RBC AUTO-ENTMCNC: 33.1 G/DL (ref 31.5–35.7)
MCV RBC AUTO: 82.4 FL (ref 79–97)
MONOCYTES # BLD AUTO: 0.34 10*3/MM3 (ref 0.1–0.9)
MONOCYTES NFR BLD AUTO: 7.6 % (ref 5–12)
NEUTROPHILS NFR BLD AUTO: 1.7 10*3/MM3 (ref 1.7–7)
NEUTROPHILS NFR BLD AUTO: 37.9 % (ref 42.7–76)
NRBC BLD AUTO-RTO: 0 /100 WBC (ref 0–0.2)
PLATELET # BLD AUTO: 207 10*3/MM3 (ref 140–450)
PMV BLD AUTO: 10.7 FL (ref 6–12)
POTASSIUM SERPL-SCNC: 4.1 MMOL/L (ref 3.5–5.2)
PROT SERPL-MCNC: 7.8 G/DL (ref 6–8.5)
RBC # BLD AUTO: 5.86 10*6/MM3 (ref 4.14–5.8)
SODIUM SERPL-SCNC: 140 MMOL/L (ref 136–145)
TRIGL SERPL-MCNC: 132 MG/DL (ref 0–150)
TSH SERPL DL<=0.05 MIU/L-ACNC: 1.53 UIU/ML (ref 0.27–4.2)
VLDLC SERPL-MCNC: 24 MG/DL (ref 5–40)
WBC NRBC COR # BLD: 4.49 10*3/MM3 (ref 3.4–10.8)

## 2022-03-16 PROCEDURE — 80053 COMPREHEN METABOLIC PANEL: CPT

## 2022-03-16 PROCEDURE — 85025 COMPLETE CBC W/AUTO DIFF WBC: CPT

## 2022-03-16 PROCEDURE — 90686 IIV4 VACC NO PRSV 0.5 ML IM: CPT | Performed by: NURSE PRACTITIONER

## 2022-03-16 PROCEDURE — 99214 OFFICE O/P EST MOD 30 MIN: CPT | Performed by: NURSE PRACTITIONER

## 2022-03-16 PROCEDURE — 90471 IMMUNIZATION ADMIN: CPT | Performed by: NURSE PRACTITIONER

## 2022-03-16 PROCEDURE — 80061 LIPID PANEL: CPT

## 2022-03-16 PROCEDURE — 90472 IMMUNIZATION ADMIN EACH ADD: CPT | Performed by: NURSE PRACTITIONER

## 2022-03-16 PROCEDURE — 99396 PREV VISIT EST AGE 40-64: CPT | Performed by: NURSE PRACTITIONER

## 2022-03-16 PROCEDURE — 84443 ASSAY THYROID STIM HORMONE: CPT

## 2022-03-16 PROCEDURE — 90750 HZV VACC RECOMBINANT IM: CPT | Performed by: NURSE PRACTITIONER

## 2022-03-16 RX ORDER — ATORVASTATIN CALCIUM 20 MG/1
20 TABLET, FILM COATED ORAL DAILY
Qty: 90 TABLET | Refills: 2 | Status: SHIPPED | OUTPATIENT
Start: 2022-03-16 | End: 2022-05-18 | Stop reason: SDUPTHER

## 2022-03-16 RX ORDER — CYCLOBENZAPRINE HCL 10 MG
TABLET ORAL
Qty: 30 TABLET | Refills: 0 | Status: SHIPPED | OUTPATIENT
Start: 2022-03-16 | End: 2022-05-19

## 2022-03-16 NOTE — PROGRESS NOTES
Follow Up Office Note     Patient Name: Aron Driscoll  : 1960   MRN: [id number]     Chief Complaint:    Chief Complaint   Patient presents with   • Annual Exam       History of Present Illness: Aron Driscoll is a 61 y.o. male who presents today for annual physical exam. Patient describes his overall health as good. Patient has chronic hypertension which has been stable on current medication. However, his diastolic blood pressure today is elevated. Patient also has chronic HLD which has been stable and controlled on atorvastatin.   Patient c/o mild low back pain x 2-3 weeks. He states that he has been doing some lifting and bending in the past few weeks and may have overexerted himself.      Subjective      Review of Systems:   Review of Systems   Constitutional: Negative for activity change, appetite change, chills, diaphoresis, fatigue and fever.   HENT: Negative.    Eyes: Negative.    Respiratory: Negative for chest tightness and shortness of breath.    Cardiovascular: Negative for chest pain, palpitations and leg swelling.   Gastrointestinal: Negative.    Genitourinary: Negative.    Musculoskeletal: Positive for back pain and myalgias (lower back spasms). Negative for arthralgias and neck pain.   Skin: Negative.    Neurological: Negative.    Psychiatric/Behavioral: Negative.         Past Medical History:   Past Medical History:   Diagnosis Date   • CKD (chronic kidney disease) stage 3, GFR 30-59 ml/min ([facility])    • Glaucoma    • HLD (hyperlipidemia)    • HTN (hypertension)          Medications:     Current Outpatient Medications:   •  amLODIPine (NORVASC) 10 MG tablet, Take 1 tablet by mouth Daily., Disp: 90 tablet, Rfl: 3  •  dorzolamide-timolol (COSOPT) 22.3-6.8 MG/ML ophthalmic solution, Administer 1 drop to both eyes 2 (Two) Times a Day., Disp: 10 mL, Rfl: 5  •  latanoprost (XALATAN) 0.005 % ophthalmic solution, Administer 1 drop to both eyes every night at bedtime., Disp: 7.5 mL,  "Rfl: 5  •  lisinopril (PRINIVIL,ZESTRIL) 20 MG tablet, Take 1 tablet by mouth Daily., Disp: 90 tablet, Rfl: 3  •  sildenafil (Viagra) 100 MG tablet, Take 1 tablet by mouth Daily As Needed for Erectile Dysfunction., Disp: 9 tablet, Rfl: 11  •  atorvastatin (LIPITOR) 20 MG tablet, Take 1 tablet by mouth Daily., Disp: 90 tablet, Rfl: 2  •  cyclobenzaprine (FLEXERIL) 10 MG tablet, 1/2-1 tablet PO Qhs muscle spasm of back, Disp: 30 tablet, Rfl: 0    Allergies:   No Known Allergies    PHQ-2/PHQ-9 Depression Screening 3/16/2022   Retired Total Score -   Little Interest or Pleasure in Doing Things 0-->not at all   Feeling Down, Depressed or Hopeless 0-->not at all   PHQ-9: Brief Depression Severity Measure Score 0     Objective     Physical Exam:  Vital Signs:   Vitals:    03/16/22 0951   BP: 122/90   Pulse: 89   Resp: 20   Temp: 98 °F (36.7 °C)   SpO2: 98%   Weight: 105 kg (232 lb)   Height: 193 cm (76\")   PainSc:   2     Body mass index is 28.24 kg/m².     Physical Exam  Vitals and nursing note reviewed. Exam conducted with a chaperone present.   Constitutional:       General: He is not in acute distress.     Appearance: Normal appearance. He is well-developed and well-groomed. He is not ill-appearing, toxic-appearing or diaphoretic.   HENT:      Head: Normocephalic and atraumatic.      Right Ear: Tympanic membrane, ear canal and external ear normal.      Left Ear: Tympanic membrane, ear canal and external ear normal.      Nose: Nose normal.      Mouth/Throat:      Lips: Pink.      Mouth: Mucous membranes are moist.      Pharynx: Oropharynx is clear. Uvula midline. No posterior oropharyngeal erythema.   Neck:      Thyroid: No thyroid mass, thyromegaly or thyroid tenderness.   Cardiovascular:      Rate and Rhythm: Normal rate and regular rhythm.      Pulses: Normal pulses.      Heart sounds: Normal heart sounds, S1 normal and S2 normal. No murmur heard.  Pulmonary:      Effort: Pulmonary effort is normal. No respiratory " distress.      Breath sounds: Normal breath sounds.   Abdominal:      General: Bowel sounds are normal. There is no distension.      Palpations: Abdomen is soft.      Tenderness: There is no abdominal tenderness.   Musculoskeletal:         General: Normal range of motion.      Cervical back: Normal, normal range of motion and neck supple.      Thoracic back: Normal.      Lumbar back: Spasms and tenderness present. No swelling, edema or deformity. Normal range of motion.      Right lower leg: No edema.      Left lower leg: No edema.   Lymphadenopathy:      Cervical: No cervical adenopathy.   Skin:     General: Skin is warm and dry.      Capillary Refill: Capillary refill takes less than 2 seconds.      Findings: No rash.   Neurological:      General: No focal deficit present.      Mental Status: He is alert and oriented to person, place, and time.   Psychiatric:         Attention and Perception: Attention and perception normal.         Mood and Affect: Mood and affect normal.         Speech: Speech normal.         Behavior: Behavior normal. Behavior is cooperative.         Thought Content: Thought content normal.         Cognition and Memory: Cognition and memory normal.         Judgment: Judgment normal.         Assessment / Plan      Assessment/Plan:   Diagnoses and all orders for this visit:    1. Annual physical exam (Primary)  -     Comprehensive Metabolic Panel; Future  -     Lipid Panel; Future  -     TSH; Future  -     CBC Auto Differential; Future  -     Urinalysis With Culture If Indicated -; Future    2. Primary hypertension  Assessment & Plan:  Hypertension is stable, but above goal of less than 130/80..  Continue current treatment regimen.  Dietary sodium restriction.  Weight loss.  Regular aerobic exercise.  Continue current medications.  Ambulatory blood pressure monitoring.  Blood pressure will be reassessed at the next regular appointment.    Orders:  -     Comprehensive Metabolic Panel; Future  -      TSH; Future  -     CBC Auto Differential; Future  -     POC Microalbumin    3. Mixed hyperlipidemia  Assessment & Plan:  Lipid abnormalities are improving with treatment.  Continue current medication. Heart healthy diet with routine aerobic exercise.  Lipids will be reassessed in 3 months.    Orders:  -     Lipid Panel; Future    4. Stage 3 chronic kidney disease, unspecified whether stage 3a or 3b CKD (HCC)  -     Comprehensive Metabolic Panel; Future  -     POC Microalbumin    5. Musculoskeletal pain  -     cyclobenzaprine (FLEXERIL) 10 MG tablet; 1/2-1 tablet PO Qhs muscle spasm of back  Dispense: 30 tablet; Refill: 0    6. Need for influenza vaccination  -     FluLaval/Fluarix/Fluzone >6 Months ([id number])    7. Need for shingles vaccine  -     Shingrix Vaccine     Follow Up:   PRN and at next scheduled appointment(s) with PCP.    Discussed the nature of the medical condition(s) risks, complications, implications, management, safe and proper use of medications. Encouraged medication compliance, and keeping scheduled follow up appointments with me and any other providers.      The patient is here for a health maintenance visit.  Currently, the patient consumes a healthy diet and has an adequate exercise regimen. Screening lab work is ordered.  Immunizations administered today and vaccine education is provided.  Advice and education is given regarding nutrition, aerobic exercise, routine dental evaluations, routine eye exams, reproductive health, cardiovascular risk reduction, sunscreen use, self skin examination (annual dermatology evaluations) and seat belt use (general overall safety).  Further recommendations after lab evaluation.  Annual wellness evaluations recommended.      CHELSIE Egan  [facility]

## 2022-03-16 NOTE — TELEPHONE ENCOUNTER
Caller: Yamila Pingshakir Blakely    Relationship: Self    Best call back number:     Requested Prescriptions:   Requested Prescriptions     Pending Prescriptions Disp Refills   • atorvastatin (LIPITOR) 20 MG tablet 90 tablet 2     Sig: Take 1 tablet by mouth Daily.        Pharmacy where request should be sent: [facility] #[id number] [place], [place] - [address]  - [phone]  - [phone] FX     Additional details provided by patient: PATIENT IS OUT OF MEDICATION    Does the patient have less than a 3 day supply:  [x] Yes  [] No    Randell Parnell Rep   03/16/22 11:22 EDT

## 2022-03-17 DIAGNOSIS — N18.31 STAGE 3A CHRONIC KIDNEY DISEASE: Primary | ICD-10-CM

## 2022-03-17 DIAGNOSIS — R79.89 INCREASE IN CREATININE: ICD-10-CM

## 2022-03-20 DIAGNOSIS — R97.20 ELEVATED PROSTATE SPECIFIC ANTIGEN (PSA): Primary | ICD-10-CM

## 2022-03-20 PROBLEM — H40.9 GLAUCOMA: Chronic | Status: ACTIVE | Noted: 2020-02-13

## 2022-03-20 PROBLEM — I10 HTN (HYPERTENSION): Chronic | Status: ACTIVE | Noted: 2020-02-13

## 2022-03-20 PROBLEM — N18.30 CKD (CHRONIC KIDNEY DISEASE) STAGE 3, GFR 30-59 ML/MIN: Chronic | Status: ACTIVE | Noted: 2020-02-13

## 2022-03-20 PROBLEM — E78.5 HLD (HYPERLIPIDEMIA): Chronic | Status: ACTIVE | Noted: 2020-02-13

## 2022-03-20 NOTE — ASSESSMENT & PLAN NOTE
Lipid abnormalities are improving with treatment.  Continue current medication. Heart healthy diet with routine aerobic exercise.  Lipids will be reassessed in 3 months.

## 2022-03-20 NOTE — ASSESSMENT & PLAN NOTE
Hypertension is stable, but above goal of less than 130/80..  Continue current treatment regimen.  Dietary sodium restriction.  Weight loss.  Regular aerobic exercise.  Continue current medications.  Ambulatory blood pressure monitoring.  Blood pressure will be reassessed at the next regular appointment.

## 2022-05-18 DIAGNOSIS — E78.01 FAMILIAL HYPERCHOLESTEROLEMIA: ICD-10-CM

## 2022-05-18 DIAGNOSIS — N52.02 CORPORO-VENOUS OCCLUSIVE ERECTILE DYSFUNCTION: ICD-10-CM

## 2022-05-18 DIAGNOSIS — Z76.0 MEDICATION REFILL: ICD-10-CM

## 2022-05-18 DIAGNOSIS — M79.18 MUSCULOSKELETAL PAIN: ICD-10-CM

## 2022-05-18 DIAGNOSIS — I10 ESSENTIAL HYPERTENSION: ICD-10-CM

## 2022-05-18 RX ORDER — LISINOPRIL 20 MG/1
20 TABLET ORAL DAILY
Qty: 90 TABLET | Refills: 3 | Status: CANCELLED | OUTPATIENT
Start: 2022-05-18

## 2022-05-18 NOTE — TELEPHONE ENCOUNTER
Caller: Aron Driscoll    Relationship: Self    Best call back number: [phone]    Requested Prescriptions:   Requested Prescriptions     Pending Prescriptions Disp Refills   • cyclobenzaprine (FLEXERIL) 10 MG tablet [Pharmacy Med Name: CYCLOBENZAPRINE 10MG TABLETS] 30 tablet 0     Sig: TAKE 1/2 TO 1 TABLET BY MOUTH EVERY NIGHT AT BEDTIME FOR MUSCLE SPASM   • sildenafil (VIAGRA) 100 MG tablet [Pharmacy Med Name: SILDENAFIL 100MG TABLETS] 9 tablet 11     Sig: TAKE 1 TABLET BY MOUTH DAILY AS NEEDED FOR ERECTILE DYSFUNCTION   • amLODIPine (NORVASC) 10 MG tablet 90 tablet 3     Sig: Take 1 tablet by mouth Daily.   • atorvastatin (LIPITOR) 20 MG tablet 90 tablet 2     Sig: Take 1 tablet by mouth Daily.   • lisinopril (PRINIVIL,ZESTRIL) 20 MG tablet 90 tablet 3     Sig: Take 1 tablet by mouth Daily.        Pharmacy where request should be sent: [facility] #[id number] [id number]  - [phone]  - [phone] FX     Additional details provided by patient: PATIENT HAS A FEW DAYS LEFT OF MEDICATIONS    Does the patient have less than a 3 day supply:  [x] Yes  [] No    Randell Murray Rep   05/18/22 11:21 EDT

## 2022-05-18 NOTE — TELEPHONE ENCOUNTER
Last OV: 3/16/22  Upcoming OV: 6/15/22  Last filled:   Atorvastatin- 3/16/22  Cyclobenzaprine- 3/16/22  Sildenafil- 3/10/21  amLODIPine- 3/10//21

## 2022-05-19 DIAGNOSIS — H40.9 GLAUCOMA OF BOTH EYES, UNSPECIFIED GLAUCOMA TYPE: ICD-10-CM

## 2022-05-19 DIAGNOSIS — Z76.0 MEDICATION REFILL: ICD-10-CM

## 2022-05-19 RX ORDER — LATANOPROST 50 UG/ML
1 SOLUTION/ DROPS OPHTHALMIC
Qty: 7.5 ML | Refills: 5 | Status: SHIPPED | OUTPATIENT
Start: 2022-05-19

## 2022-05-19 RX ORDER — ATORVASTATIN CALCIUM 20 MG/1
20 TABLET, FILM COATED ORAL DAILY
Qty: 90 TABLET | Refills: 2 | Status: SHIPPED | OUTPATIENT
Start: 2022-05-19 | End: 2022-07-22 | Stop reason: SDUPTHER

## 2022-05-19 RX ORDER — CYCLOBENZAPRINE HCL 10 MG
TABLET ORAL
Qty: 30 TABLET | Refills: 0 | Status: SHIPPED | OUTPATIENT
Start: 2022-05-19 | End: 2023-02-28

## 2022-05-19 RX ORDER — AMLODIPINE BESYLATE 10 MG/1
10 TABLET ORAL DAILY
Qty: 90 TABLET | Refills: 3 | Status: SHIPPED | OUTPATIENT
Start: 2022-05-19 | End: 2023-02-10 | Stop reason: SDUPTHER

## 2022-05-19 RX ORDER — SILDENAFIL 100 MG/1
100 TABLET, FILM COATED ORAL DAILY PRN
Qty: 9 TABLET | Refills: 11 | Status: SHIPPED | OUTPATIENT
Start: 2022-05-19

## 2022-06-03 RX ORDER — LISINOPRIL 20 MG/1
20 TABLET ORAL DAILY
Qty: 90 TABLET | Refills: 0 | Status: SHIPPED | OUTPATIENT
Start: 2022-06-03 | End: 2022-08-22

## 2022-07-21 NOTE — PROGRESS NOTES
[facility] Cardiology    Encounter Date: 2022    Patient ID: Aron Driscoll is a 61 y.o. male.  : 1960     PCP: Julia Moreau APRN       Chief Complaint: Chest pain, atypical      PROBLEM LIST:  1. Hypertensive heart disease:  a. Echo, 2020: EF 56-60% with mild concentric LVH. Mild-to-moderate MR, mild TR, RVSP < 35 mmHg.    b. Echo, 2020: EF 55%. Mild to moderate MR. Mild TR. Mild dilation of aortic root.   c. Cardiolite, 2020: EF 56%. No evidence of ischemia. Low risk study.   2. First-degree AV block on ECG.  3. Palpitations:  a. 72h Holter, 2020: SR with 1.5% PACs and 0.2% PVCs. Rare short SVT.  4. Hypertension.  5. Hyperlipidemia.  6. Aortic root dilation:  a. Echo, 2020: Mild dilation of the aortic root (4.2 cm).  7. Family history of heart disease.  8. Chronic kidney disease, stage II.    History of Present Illness  Patient presents today for a 1 year follow-up with a history of chest pain, hypertensive heart disease without heart failure, aortic root dilation, and cardiac risk factors. Since last visit, patient has been doing well overall from a cardiovascular standpoint. Patient does not maintain a regular physical activity level other than going on walks on occasion. 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 Patient states that he was previously on one medication for his blood pressure, however, his measurements were still high. His PCP prescribed an additional blood pressure medication to manage this. Patient denies chest pain, shortness of breath, orthopnea, palpitations, edema, dizziness, and syncope.     No Known Allergies      Current Outpatient Medications:   •  amLODIPine (NORVASC) 10 MG tablet, Take 1 tablet by mouth Daily., Disp: 90 tablet, Rfl: 3  •  atorvastatin (LIPITOR) 40 MG tablet, Take 1 tablet by mouth Daily., Disp: 90 tablet, Rfl: 3  •  cyclobenzaprine (FLEXERIL) 10 MG tablet,  "TAKE 1/2 TO 1 TABLET BY MOUTH EVERY NIGHT AT BEDTIME FOR MUSCLE SPASM, Disp: 30 tablet, Rfl: 0  •  dorzolamide-timolol (COSOPT) 22.3-6.8 MG/ML ophthalmic solution, Administer 1 drop to both eyes 2 (Two) Times a Day., Disp: 10 mL, Rfl: 5  •  latanoprost (XALATAN) 0.005 % ophthalmic solution, Administer 1 drop to both eyes every night at bedtime., Disp: 7.5 mL, Rfl: 5  •  lisinopril (PRINIVIL,ZESTRIL) 20 MG tablet, Take 1 tablet by mouth Daily., Disp: 90 tablet, Rfl: 0  •  sildenafil (VIAGRA) 100 MG tablet, TAKE 1 TABLET BY MOUTH DAILY AS NEEDED FOR ERECTILE DYSFUNCTION, Disp: 9 tablet, Rfl: 11    The following portions of the patient's history were reviewed and updated as appropriate: allergies, current medications, past family history, past medical history, past social history, past surgical history and problem list.    ROS  Review of Systems   Constitution: Negative for chills, fever, fatigue, generalized weakness.   Cardiovascular: Negative for chest pain, dyspnea on exertion, leg swelling, palpitations, orthopnea, and syncope.   Respiratory: Negative for cough, shortness of breath, and wheezing.  HENT: Negative for ear pain, nosebleeds, and tinnitus.  Gastrointestinal: Negative for abdominal pain, constipation, diarrhea, nausea and vomiting.   Genitourinary: No urinary symptoms.  Musculoskeletal: Negative for muscle cramps.  Neurological: Negative for dizziness, headaches, loss of balance, numbness, and symptoms of stroke.  Psychiatric: Normal mental status.     All other systems reviewed and are negative.        Objective:     BP 98/76 (BP Location: Left arm, Patient Position: Sitting)   Pulse 83   Ht 193 cm (76\")   Wt 108 kg (238 lb)   SpO2 97%   BMI 28.97 kg/m²      Physical Exam  Constitutional: Patient appears well-developed and well-nourished.   HENT: HEENT exam unremarkable.   Neck: Neck supple. No JVD present. No carotid bruits.   Cardiovascular: Normal rate, regular rhythm and normal heart sounds. " No murmur heard.   2+ symmetric pulses.   Pulmonary/Chest: Breath sounds normal. Does not exhibit tenderness.   Abdominal: Abdomen benign.   Musculoskeletal: Does not exhibit edema.   Neurological: Neurological exam unremarkable.   Vitals reviewed.    Data Review:   Lab Results   Component Value Date    GLUCOSE 85 03/16/2022    BUN 15 07/20/2022    CREATININE 1.59 (H) 07/20/2022    EGFRIFNONA 45 (L) 07/20/2022    BCR 9 07/20/2022    K 3.8 07/20/2022    CO2 28 07/20/2022    CALCIUM 9.6 07/20/2022    ALBUMIN 4.6 07/20/2022    AST 22 03/16/2022    ALT 27 03/16/2022     Lab Results   Component Value Date    CHOL 210 (H) 03/16/2022    TRIG 132 03/16/2022    HDL 32 (L) 03/16/2022     (H) 03/16/2022      Lab Results   Component Value Date    WBC 4.36 07/20/2022    RBC 5.28 07/20/2022    HGB 14.3 07/20/2022    HCT 45.4 07/20/2022    MCV 86 07/20/2022     07/20/2022     Lab Results   Component Value Date    TSH 1.530 03/16/2022        Procedures           Assessment:      Diagnosis Plan   1. Chest pain, atypical, resolved with negative previous cardiac work-up. Stable and asymptomatic.    2. Hypertensive heart disease without heart failure  Stable and asymptomatic.  Echocardiogram to reassess aortic root dilatation and mitral regurgitation.   3. Aortic root dilation (HCC)  Echocardiogram to be ordered to assess vascular function.    4. Essential hypertension  Well controlled. On amlodipine 10 mg daily and lisinopril 20 mg daily.    5. Mixed hyperlipidemia  uncontrolled, increase atorvastatin to 40 mg daily, follow-up with PCP for reassessment..      Plan:   Stable cardiac status.   Echocardiogram to be ordered to assess aortic root and valvular function.  Improve diet for better control of dyslipidemia, increase Lipitor to 40 mg daily, follow-up with PCP for reassessment in 2 to 3 months.  His serum creatinine is also elevated which was discussed, patient states that he has an appointment with a nephrologist  next week.  Begin routine aerobic exercise for at least 30 minutes 5 days per week.  Continue all other current medications.   FU in 12 MO, sooner as needed.  Thank you for allowing us to participate in the care of your patient.     Scribed for Bel Umana MD by Jessica Jones. 7/22/2022 11:13 EDT    I, Bel Umana MD, personally performed the services described in this documentation as scribed by the above named individual in my presence, and it is both accurate and complete.  7/22/2022  11:42 EDT        Please note that portions of this note may have been completed with a voice recognition program. Efforts were made to edit the dictations, but occasionally words are mistranscribed.

## 2022-07-22 ENCOUNTER — OFFICE VISIT (OUTPATIENT)
Dept: CARDIOLOGY | Facility: CLINIC | Age: 62
End: 2022-07-22

## 2022-07-22 ENCOUNTER — HOSPITAL ENCOUNTER (OUTPATIENT)
Dept: CARDIOLOGY | Facility: HOSPITAL | Age: 62
Discharge: HOME | End: 2022-07-22
Admitting: INTERNAL MEDICINE

## 2022-07-22 VITALS
SYSTOLIC BLOOD PRESSURE: 98 MMHG | HEART RATE: 83 BPM | DIASTOLIC BLOOD PRESSURE: 76 MMHG | BODY MASS INDEX: 28.98 KG/M2 | WEIGHT: 238 LBS | HEIGHT: 76 IN | OXYGEN SATURATION: 97 %

## 2022-07-22 VITALS — BODY MASS INDEX: 28.25 KG/M2 | WEIGHT: 232 LBS | HEIGHT: 76 IN

## 2022-07-22 DIAGNOSIS — I77.810 AORTIC ROOT DILATION: ICD-10-CM

## 2022-07-22 DIAGNOSIS — I10 ESSENTIAL HYPERTENSION: ICD-10-CM

## 2022-07-22 DIAGNOSIS — E78.2 MIXED HYPERLIPIDEMIA: ICD-10-CM

## 2022-07-22 DIAGNOSIS — R07.89 CHEST PAIN, ATYPICAL: ICD-10-CM

## 2022-07-22 DIAGNOSIS — I11.9 HYPERTENSIVE HEART DISEASE WITHOUT HEART FAILURE: ICD-10-CM

## 2022-07-22 DIAGNOSIS — Z76.0 MEDICATION REFILL: ICD-10-CM

## 2022-07-22 DIAGNOSIS — E78.01 FAMILIAL HYPERCHOLESTEROLEMIA: ICD-10-CM

## 2022-07-22 DIAGNOSIS — R07.89 CHEST PAIN, ATYPICAL: Primary | ICD-10-CM

## 2022-07-22 LAB
BH CV ECHO MEAS - AO MAX PG: 3 MMHG
BH CV ECHO MEAS - AO MEAN PG: 1.8 MMHG
BH CV ECHO MEAS - AO V2 MAX: 86.2 CM/SEC
BH CV ECHO MEAS - AO V2 VTI: 18.6 CM
BH CV ECHO MEAS - AVA(I,D): 2.46 CM2
BH CV ECHO MEAS - EDV(CUBED): 141.7 ML
BH CV ECHO MEAS - EDV(MOD-SP2): 141 ML
BH CV ECHO MEAS - EDV(MOD-SP4): 143 ML
BH CV ECHO MEAS - EF(MOD-BP): 55 %
BH CV ECHO MEAS - EF(MOD-SP2): 57.4 %
BH CV ECHO MEAS - EF(MOD-SP4): 54.5 %
BH CV ECHO MEAS - ESV(CUBED): 38.8 ML
BH CV ECHO MEAS - ESV(MOD-SP2): 60 ML
BH CV ECHO MEAS - ESV(MOD-SP4): 65 ML
BH CV ECHO MEAS - FS: 35 %
BH CV ECHO MEAS - IVS/LVPW: 0.84 CM
BH CV ECHO MEAS - IVSD: 0.71 CM
BH CV ECHO MEAS - LA DIMENSION: 3.9 CM
BH CV ECHO MEAS - LV DIASTOLIC VOL/BSA (35-75): 61.8 CM2
BH CV ECHO MEAS - LV MASS(C)D: 142.4 GRAMS
BH CV ECHO MEAS - LV MAX PG: 2.5 MMHG
BH CV ECHO MEAS - LV MEAN PG: 1.19 MMHG
BH CV ECHO MEAS - LV SYSTOLIC VOL/BSA (12-30): 28.1 CM2
BH CV ECHO MEAS - LV V1 MAX: 79.8 CM/SEC
BH CV ECHO MEAS - LV V1 VTI: 14.3 CM
BH CV ECHO MEAS - LVIDD: 5.2 CM
BH CV ECHO MEAS - LVIDS: 3.4 CM
BH CV ECHO MEAS - LVOT AREA: 3.2 CM2
BH CV ECHO MEAS - LVOT DIAM: 2.02 CM
BH CV ECHO MEAS - LVPWD: 0.86 CM
BH CV ECHO MEAS - MV A MAX VEL: 58.6 CM/SEC
BH CV ECHO MEAS - MV DEC TIME: 0.18 MSEC
BH CV ECHO MEAS - MV E MAX VEL: 64 CM/SEC
BH CV ECHO MEAS - MV E/A: 1.09
BH CV ECHO MEAS - PA ACC SLOPE: 337.6 CM/SEC2
BH CV ECHO MEAS - PA ACC TIME: 0.12 SEC
BH CV ECHO MEAS - PA PR(ACCEL): 25.1 MMHG
BH CV ECHO MEAS - PI END-D VEL: 115.4 CM/SEC
BH CV ECHO MEAS - RAP SYSTOLE: 3 MMHG
BH CV ECHO MEAS - RVSP: 18 MMHG
BH CV ECHO MEAS - SI(MOD-SP2): 35 ML/M2
BH CV ECHO MEAS - SI(MOD-SP4): 33.7 ML/M2
BH CV ECHO MEAS - SV(LVOT): 45.7 ML
BH CV ECHO MEAS - SV(MOD-SP2): 81 ML
BH CV ECHO MEAS - SV(MOD-SP4): 78 ML
BH CV ECHO MEAS - TAPSE (>1.6): 2.2 CM
BH CV ECHO MEAS - TR MAX PG: 15 MMHG
BH CV ECHO MEAS - TR MAX VEL: 191.4 CM/SEC
BH CV VAS BP RIGHT ARM: NORMAL MMHG
BH CV XLRA - RV BASE: 4.2 CM
BH CV XLRA - RV LENGTH: 7.8 CM
BH CV XLRA - RV MID: 3.2 CM
BH CV XLRA - TDI S': 7.58 CM/SEC
LEFT ATRIUM VOLUME INDEX: 27.7 ML/M2
LV EF 2D ECHO EST: 60 %
MAXIMAL PREDICTED HEART RATE: 160 BPM
SINUS: 4.2 CM
STRESS TARGET HR: 136 BPM

## 2022-07-22 PROCEDURE — 93306 TTE W/DOPPLER COMPLETE: CPT | Performed by: INTERNAL MEDICINE

## 2022-07-22 PROCEDURE — 99214 OFFICE O/P EST MOD 30 MIN: CPT | Performed by: INTERNAL MEDICINE

## 2022-07-22 PROCEDURE — 93306 TTE W/DOPPLER COMPLETE: CPT

## 2022-07-22 RX ORDER — ATORVASTATIN CALCIUM 40 MG/1
40 TABLET, FILM COATED ORAL DAILY
Qty: 90 TABLET | Refills: 3 | Status: SHIPPED | OUTPATIENT
Start: 2022-07-22 | End: 2023-02-10 | Stop reason: SDUPTHER

## 2022-07-27 ENCOUNTER — OFFICE VISIT (OUTPATIENT)
Dept: FAMILY MEDICINE CLINIC | Facility: CLINIC | Age: 62
End: 2022-07-27

## 2022-07-27 VITALS
OXYGEN SATURATION: 98 % | BODY MASS INDEX: 29.35 KG/M2 | HEIGHT: 76 IN | WEIGHT: 241 LBS | DIASTOLIC BLOOD PRESSURE: 92 MMHG | HEART RATE: 97 BPM | SYSTOLIC BLOOD PRESSURE: 134 MMHG | TEMPERATURE: 97.8 F

## 2022-07-27 DIAGNOSIS — M54.50 ACUTE MIDLINE LOW BACK PAIN WITHOUT SCIATICA: Primary | ICD-10-CM

## 2022-07-27 PROCEDURE — 99214 OFFICE O/P EST MOD 30 MIN: CPT | Performed by: NURSE PRACTITIONER

## 2022-07-27 PROCEDURE — 96372 THER/PROPH/DIAG INJ SC/IM: CPT | Performed by: NURSE PRACTITIONER

## 2022-07-27 RX ORDER — ACETAMINOPHEN 500 MG
500 TABLET ORAL EVERY 6 HOURS PRN
Qty: 30 TABLET | Refills: 0 | Status: SHIPPED | OUTPATIENT
Start: 2022-07-27

## 2022-07-27 RX ORDER — DEXAMETHASONE SODIUM PHOSPHATE 4 MG/ML
4 INJECTION, SOLUTION INTRA-ARTICULAR; INTRALESIONAL; INTRAMUSCULAR; INTRAVENOUS; SOFT TISSUE ONCE
Status: COMPLETED | OUTPATIENT
Start: 2022-07-27 | End: 2022-07-27

## 2022-07-27 RX ADMIN — DEXAMETHASONE SODIUM PHOSPHATE 4 MG: 4 INJECTION, SOLUTION INTRA-ARTICULAR; INTRALESIONAL; INTRAMUSCULAR; INTRAVENOUS; SOFT TISSUE at 10:34

## 2022-07-27 NOTE — PROGRESS NOTES
Follow Up Office Note     Patient Name: Aron Driscoll  : 1960   MRN: [id number]     Chief Complaint:    Chief Complaint   Patient presents with   • Back Pain     Follow up       History of Present Illness: Aron Driscoll is a 61 y.o. male who presents today with complaint of low back pain since March of this year. Patient states that the pain has been worse over the past 2-3 weeks. He states that he has been doing a lot of heavy lifting recently (up to 50 pounds).  He denies paresis or paresthesia.  He denies change in bowel or bladder function or saddle anesthesia.  He states the pain is localized to his lower back.  He states the pain is worse when he turns to the side or twists his body.  Patient has been taking cyclobenzaprine for lower back muscle spasms with mild improvement in his symptoms.        Subjective      Review of Systems:   Review of Systems   Constitutional: Negative.    Respiratory: Negative.    Cardiovascular: Negative.  Negative for chest pain, palpitations and leg swelling.   Genitourinary: Negative for difficulty urinating.   Musculoskeletal: Positive for back pain and myalgias. Negative for gait problem, neck pain and neck stiffness.   Neurological: Negative.         Past Medical History:   Past Medical History:   Diagnosis Date   • CKD (chronic kidney disease) stage 3, GFR 30-59 ml/min ([facility])    • Glaucoma    • HLD (hyperlipidemia)    • HTN (hypertension)          Medications:     Current Outpatient Medications:   •  amLODIPine (NORVASC) 10 MG tablet, Take 1 tablet by mouth Daily., Disp: 90 tablet, Rfl: 3  •  atorvastatin (LIPITOR) 40 MG tablet, Take 1 tablet by mouth Daily., Disp: 90 tablet, Rfl: 3  •  cyclobenzaprine (FLEXERIL) 10 MG tablet, TAKE 1/2 TO 1 TABLET BY MOUTH EVERY NIGHT AT BEDTIME FOR MUSCLE SPASM, Disp: 30 tablet, Rfl: 0  •  dorzolamide-timolol (COSOPT) 22.3-6.8 MG/ML ophthalmic solution, Administer 1 drop to both eyes 2 (Two) Times a Day., Disp:  "10 mL, Rfl: 5  •  latanoprost (XALATAN) 0.005 % ophthalmic solution, Administer 1 drop to both eyes every night at bedtime., Disp: 7.5 mL, Rfl: 5  •  lisinopril (PRINIVIL,ZESTRIL) 20 MG tablet, Take 1 tablet by mouth Daily., Disp: 90 tablet, Rfl: 0  •  sildenafil (VIAGRA) 100 MG tablet, TAKE 1 TABLET BY MOUTH DAILY AS NEEDED FOR ERECTILE DYSFUNCTION, Disp: 9 tablet, Rfl: 11  •  acetaminophen (TYLENOL) 500 MG tablet, Take 1 tablet by mouth Every 6 (Six) Hours As Needed for Moderate Pain ., Disp: 30 tablet, Rfl: 0  No current facility-administered medications for this visit.    Allergies:   No Known Allergies      Objective     Physical Exam:  Vital Signs:   Vitals:    07/27/22 0941   BP: 134/92   Pulse: 97   Temp: 97.8 °F (36.6 °C)   SpO2: 98%   Weight: 109 kg (241 lb)   Height: 193 cm (75.98\")   PainSc: 0-No pain     Body mass index is 29.35 kg/m².     Physical Exam  Vitals and nursing note reviewed.   Constitutional:       General: He is not in acute distress.     Appearance: Normal appearance. He is well-developed. He is not ill-appearing, toxic-appearing or diaphoretic.   HENT:      Head: Normocephalic and atraumatic.   Cardiovascular:      Rate and Rhythm: Normal rate and regular rhythm.   Pulmonary:      Effort: Pulmonary effort is normal. No respiratory distress.      Breath sounds: Normal breath sounds. No stridor. No wheezing.   Musculoskeletal:      Cervical back: Normal.      Thoracic back: Normal.      Lumbar back: Normal. Negative right straight leg raise test and negative left straight leg raise test.   Skin:     General: Skin is warm and dry.   Neurological:      General: No focal deficit present.      Mental Status: He is alert and oriented to person, place, and time.   Psychiatric:         Mood and Affect: Mood normal.         Behavior: Behavior normal. Behavior is cooperative.         Thought Content: Thought content normal.         Judgment: Judgment normal.         Assessment / Plan  "     Assessment/Plan:   Diagnoses and all orders for this visit:    1. Acute midline low back pain without sciatica (Primary)  -     dexamethasone (DECADRON) injection 4 mg  -     XR Spine Lumbar 4+ View; Future  -     acetaminophen (TYLENOL) 500 MG tablet; Take 1 tablet by mouth Every 6 (Six) Hours As Needed for Moderate Pain .  Dispense: 30 tablet; Refill: 0    Acute exacerbation of chronic LBP. Further workup required. Recommend patient avoid NSAIDS to to CKD. He has appointment later today with nephrology. He states he follows up with his urologist in October regarding his elevated PSA.  Diagnostic imaging per orders, further recommendation after xray evaluation.   Discussed PT referral with patient, he declined at this time.  Continue cyclobenzaprine prn.    Follow Up:   PRN and at next scheduled appointment(s) with PCP.    Discussed the nature of the medical condition(s) risks, complications, implications, management, safe and proper use of medications. Encouraged medication compliance, and keeping scheduled follow up appointments with me and any other providers.      RTC if symptoms fail to improve, to ER if symptoms worsen.        *Dictated Utilizing Dragon Dictation   Please note that portions of this note were completed with a voice recognition program.   Part of this note may be an electronic transcription/translation of spoken language to printed text using the Dragon Dictation System.          CHELSIE Egan  [facility] [place]

## 2022-07-29 ENCOUNTER — TELEPHONE (OUTPATIENT)
Dept: CARDIOLOGY | Facility: CLINIC | Age: 62
End: 2022-07-29

## 2022-07-29 NOTE — TELEPHONE ENCOUNTER
----- Message from Bel Umana MD sent at 7/22/2022  4:55 PM EDT -----  I just realized that the patient already had an echo today, please notify him that his echo has shown stable findings and no significant change compared to previous study and in fact it looks a little better.  The aortic size is stable and the mitral valve is also stable.  No further testing is needed, continue same treatment with focus on control of blood pressure and cholesterol.

## 2022-08-22 RX ORDER — LISINOPRIL 20 MG/1
20 TABLET ORAL DAILY
Qty: 90 TABLET | Refills: 0 | Status: SHIPPED | OUTPATIENT
Start: 2022-08-22 | End: 2022-11-12

## 2022-11-12 RX ORDER — LISINOPRIL 20 MG/1
20 TABLET ORAL DAILY
Qty: 90 TABLET | Refills: 0 | Status: SHIPPED | OUTPATIENT
Start: 2022-11-12 | End: 2023-02-10 | Stop reason: SDUPTHER

## 2023-02-10 DIAGNOSIS — Z76.0 MEDICATION REFILL: ICD-10-CM

## 2023-02-10 DIAGNOSIS — E78.01 FAMILIAL HYPERCHOLESTEROLEMIA: ICD-10-CM

## 2023-02-10 DIAGNOSIS — I10 ESSENTIAL HYPERTENSION: ICD-10-CM

## 2023-02-10 RX ORDER — ATORVASTATIN CALCIUM 40 MG/1
40 TABLET, FILM COATED ORAL DAILY
Qty: 90 TABLET | Refills: 3 | Status: SHIPPED | OUTPATIENT
Start: 2023-02-10

## 2023-02-10 RX ORDER — AMLODIPINE BESYLATE 10 MG/1
10 TABLET ORAL DAILY
Qty: 90 TABLET | Refills: 3 | Status: SHIPPED | OUTPATIENT
Start: 2023-02-10

## 2023-02-10 RX ORDER — LISINOPRIL 20 MG/1
20 TABLET ORAL DAILY
Qty: 90 TABLET | Refills: 0 | Status: SHIPPED | OUTPATIENT
Start: 2023-02-10 | End: 2023-02-13

## 2023-02-10 NOTE — TELEPHONE ENCOUNTER
Rx Refill Note  Requested Prescriptions     Pending Prescriptions Disp Refills   • atorvastatin (LIPITOR) 40 MG tablet 90 tablet 3     Sig: Take 1 tablet by mouth Daily.   • amLODIPine (NORVASC) 10 MG tablet 90 tablet 3     Sig: Take 1 tablet by mouth Daily.   • lisinopril (PRINIVIL,ZESTRIL) 20 MG tablet 90 tablet 0     Sig: Take 1 tablet by mouth Daily.      Last office visit with prescribing clinician: 7/27/2022   Last telemedicine visit with prescribing clinician: Visit date not found   Next office visit with prescribing clinician: Visit date not found                         Would you like a call back once the refill request has been completed: [] Yes [] No    If the office needs to give you a call back, can they leave a voicemail: [] Yes [] No    So Del Valle MA  02/10/23, 08:48 EST

## 2023-02-10 NOTE — TELEPHONE ENCOUNTER
Caller: Aron Driscoll    Relationship: Self    Best call back number: [phone]    Requested Prescriptions:   Requested Prescriptions     Pending Prescriptions Disp Refills   • atorvastatin (LIPITOR) 40 MG tablet 90 tablet 3     Sig: Take 1 tablet by mouth Daily.   • amLODIPine (NORVASC) 10 MG tablet 90 tablet 3     Sig: Take 1 tablet by mouth Daily.   • lisinopril (PRINIVIL,ZESTRIL) 20 MG tablet 90 tablet 0     Sig: Take 1 tablet by mouth Daily.        Pharmacy where request should be sent: [facility] #[id number] [address] [facility] [address]  - [phone]  - [phone]      Additional details provided by patient: PATIENT IS OUT OF THE MEDICATIONS    Does the patient have less than a 3 day supply:  [x] Yes  [] No    Would you like a call back once the refill request has been completed: [x] Yes [] No    If the office needs to give you a call back, can they leave a voicemail: [x] Yes [] No    Randell Blas Rep   02/10/23 08:37 EST

## 2023-02-13 RX ORDER — LISINOPRIL 20 MG/1
20 TABLET ORAL DAILY
Qty: 90 TABLET | Refills: 0 | Status: SHIPPED | OUTPATIENT
Start: 2023-02-13 | End: 2023-02-28 | Stop reason: DRUGHIGH

## 2023-02-23 ENCOUNTER — TELEPHONE (OUTPATIENT)
Dept: FAMILY MEDICINE CLINIC | Facility: CLINIC | Age: 63
End: 2023-02-23
Payer: COMMERCIAL

## 2023-02-23 NOTE — TELEPHONE ENCOUNTER
STARTED TAKING AMLODIPINE AND LISINOPRIL AND BP IS HIGH, 156/101 AND HAS A HEAVY FEELING IN CHEST.  WANTS TO GO BACK TO OLD BP MEDS.  HE THINKS THE PROBLEM IS LISINOPRIL, NOT AMLODIPINE AND DOESN'T WANT TO TAKE THE LISINOPRIL ANY LONGER.      PLEASE CALL HIM TO DISCUSS.

## 2023-02-28 ENCOUNTER — LAB (OUTPATIENT)
Dept: LAB | Facility: HOSPITAL | Age: 63
End: 2023-02-28
Payer: COMMERCIAL

## 2023-02-28 ENCOUNTER — OFFICE VISIT (OUTPATIENT)
Dept: FAMILY MEDICINE CLINIC | Facility: CLINIC | Age: 63
End: 2023-02-28
Payer: COMMERCIAL

## 2023-02-28 VITALS
DIASTOLIC BLOOD PRESSURE: 79 MMHG | SYSTOLIC BLOOD PRESSURE: 119 MMHG | TEMPERATURE: 99.1 F | HEART RATE: 90 BPM | BODY MASS INDEX: 28.11 KG/M2 | OXYGEN SATURATION: 97 % | WEIGHT: 230.8 LBS | HEIGHT: 76 IN

## 2023-02-28 DIAGNOSIS — R97.20 ELEVATED PROSTATE SPECIFIC ANTIGEN (PSA): ICD-10-CM

## 2023-02-28 DIAGNOSIS — I10 PRIMARY HYPERTENSION: Primary | Chronic | ICD-10-CM

## 2023-02-28 DIAGNOSIS — I10 ESSENTIAL HYPERTENSION: ICD-10-CM

## 2023-02-28 DIAGNOSIS — N18.30 STAGE 3 CHRONIC KIDNEY DISEASE, UNSPECIFIED WHETHER STAGE 3A OR 3B CKD: Chronic | ICD-10-CM

## 2023-02-28 LAB
ALBUMIN SERPL-MCNC: 4.8 G/DL (ref 3.5–5.2)
ALBUMIN/GLOB SERPL: 1.8 G/DL
ALP SERPL-CCNC: 66 U/L (ref 39–117)
ALT SERPL W P-5'-P-CCNC: 13 U/L (ref 1–41)
ANION GAP SERPL CALCULATED.3IONS-SCNC: 11.9 MMOL/L (ref 5–15)
AST SERPL-CCNC: 17 U/L (ref 1–40)
BILIRUB SERPL-MCNC: 0.3 MG/DL (ref 0–1.2)
BUN SERPL-MCNC: 18 MG/DL (ref 8–23)
BUN/CREAT SERPL: 11.8 (ref 7–25)
CALCIUM SPEC-SCNC: 9.4 MG/DL (ref 8.6–10.5)
CHLORIDE SERPL-SCNC: 100 MMOL/L (ref 98–107)
CO2 SERPL-SCNC: 28.1 MMOL/L (ref 22–29)
CREAT SERPL-MCNC: 1.53 MG/DL (ref 0.76–1.27)
EGFRCR SERPLBLD CKD-EPI 2021: 51.4 ML/MIN/1.73
GLOBULIN UR ELPH-MCNC: 2.6 GM/DL
GLUCOSE SERPL-MCNC: 80 MG/DL (ref 65–99)
POTASSIUM SERPL-SCNC: 4 MMOL/L (ref 3.5–5.2)
PROT SERPL-MCNC: 7.4 G/DL (ref 6–8.5)
PSA SERPL-MCNC: 8.82 NG/ML (ref 0–4)
SODIUM SERPL-SCNC: 140 MMOL/L (ref 136–145)

## 2023-02-28 PROCEDURE — 84153 ASSAY OF PSA TOTAL: CPT

## 2023-02-28 PROCEDURE — 80053 COMPREHEN METABOLIC PANEL: CPT

## 2023-02-28 PROCEDURE — 99214 OFFICE O/P EST MOD 30 MIN: CPT | Performed by: NURSE PRACTITIONER

## 2023-02-28 PROCEDURE — 36415 COLL VENOUS BLD VENIPUNCTURE: CPT

## 2023-02-28 RX ORDER — LISINOPRIL 20 MG/1
40 TABLET ORAL DAILY
Qty: 60 TABLET | Refills: 2 | Status: SHIPPED | OUTPATIENT
Start: 2023-02-28 | End: 2023-03-14

## 2023-02-28 RX ORDER — LEVOFLOXACIN 500 MG/1
TABLET, FILM COATED ORAL
COMMUNITY
Start: 2022-10-21 | End: 2023-03-05

## 2023-02-28 RX ORDER — LATANOPROST 50 UG/ML
1 SOLUTION/ DROPS OPHTHALMIC
COMMUNITY
Start: 2022-09-06 | End: 2023-02-28 | Stop reason: SDUPTHER

## 2023-03-05 NOTE — ASSESSMENT & PLAN NOTE
Laboratory studies per orders, further recommendation after results evaluation.  Recommend routine nephrology follow-ups.

## 2023-03-05 NOTE — ASSESSMENT & PLAN NOTE
Hypertension is stable and controlled since patient increased lisinopril dosage.  Continue current treatment regimen.  Dietary sodium restriction.  Weight loss.  Regular aerobic exercise.  Continue current medications.  Ambulatory blood pressure monitoring.  Continue 40 mg/day dosage of lisinopril.  Blood pressure will be reassessed in 2 weeks.   Recommend patient continue to monitor blood pressure and records results with automated blood pressure machine with arm cuff (not wrist cuff).  Recommend patient bring in BP monitor with him to next OV along with log of blood pressure readings.   What Type Of Note Output Would You Prefer (Optional)?: Standard Output What Is The Reason For Today's Visit?: Full Body Skin Examination with No Concerns What Is The Reason For Today's Visit? (Being Monitored For X): concerning skin lesions on an annual basis How Severe Are Your Spot(S)?: mild Additional History: Patient states she sometimes gets cystic acne

## 2023-03-06 DIAGNOSIS — I10 PRIMARY HYPERTENSION: Chronic | ICD-10-CM

## 2023-03-14 ENCOUNTER — OFFICE VISIT (OUTPATIENT)
Dept: FAMILY MEDICINE CLINIC | Facility: CLINIC | Age: 63
End: 2023-03-14
Payer: COMMERCIAL

## 2023-03-14 VITALS
HEART RATE: 78 BPM | OXYGEN SATURATION: 99 % | WEIGHT: 232.2 LBS | SYSTOLIC BLOOD PRESSURE: 117 MMHG | DIASTOLIC BLOOD PRESSURE: 82 MMHG | TEMPERATURE: 98.2 F | BODY MASS INDEX: 28.27 KG/M2 | HEIGHT: 76 IN

## 2023-03-14 DIAGNOSIS — R97.20 ELEVATED PSA: ICD-10-CM

## 2023-03-14 DIAGNOSIS — N18.31 STAGE 3A CHRONIC KIDNEY DISEASE: Chronic | ICD-10-CM

## 2023-03-14 DIAGNOSIS — I10 PRIMARY HYPERTENSION: Primary | Chronic | ICD-10-CM

## 2023-03-14 PROCEDURE — 3079F DIAST BP 80-89 MM HG: CPT | Performed by: NURSE PRACTITIONER

## 2023-03-14 PROCEDURE — 1160F RVW MEDS BY RX/DR IN RCRD: CPT | Performed by: NURSE PRACTITIONER

## 2023-03-14 PROCEDURE — 3074F SYST BP LT 130 MM HG: CPT | Performed by: NURSE PRACTITIONER

## 2023-03-14 PROCEDURE — 1159F MED LIST DOCD IN RCRD: CPT | Performed by: NURSE PRACTITIONER

## 2023-03-14 PROCEDURE — 99214 OFFICE O/P EST MOD 30 MIN: CPT | Performed by: NURSE PRACTITIONER

## 2023-03-14 RX ORDER — LISINOPRIL 40 MG/1
40 TABLET ORAL DAILY
Qty: 30 TABLET | Refills: 2 | Status: SHIPPED | OUTPATIENT
Start: 2023-03-14

## 2023-03-19 PROBLEM — R97.20 ELEVATED PSA: Status: ACTIVE | Noted: 2023-03-19

## 2023-03-19 PROBLEM — R97.20 ELEVATED PSA: Chronic | Status: ACTIVE | Noted: 2023-03-19

## 2023-03-19 PROBLEM — N18.31 STAGE 3A CHRONIC KIDNEY DISEASE (HCC): Status: ACTIVE | Noted: 2020-02-13

## 2023-03-19 NOTE — PROGRESS NOTES
Follow Up Office Note     Patient Name: Aron Driscoll  : 1960   MRN: [id number]     Chief Complaint:    Chief Complaint   Patient presents with   • Hypertension     Per patient he is here for follow up on elevated BP   • Chronic Kidney Disease   • prostate problem       History of Present Illness: Aron Driscoll is a 62 y.o. male who presents today for re-evaluation/management chronic HTN. Patient's blood pressure has been improving with medication. He reports no issues with medication tolerability.    Patient also presents today to discuss recent lab results. His creatinine level was elevated. Patient has chronic CKD stage III which has been progressively worsening over the past 2 years.    Patient PSA increased from 7.69-8.820 in the past 6 months.    PSA DIAGNOSTIC (2023 15:19)  Comprehensive Metabolic Panel (2023 15:19)    Subjective      I have reviewed and the following portions of the patient's history were updated as appropriate: past family history, past medical history, past social history, past surgical history and problem list.    Review of Systems:   Review of Systems   Constitutional: Negative.    Respiratory: Negative.    Cardiovascular: Negative for chest pain, palpitations and leg swelling.   Neurological: Negative.         Past Medical History:   Past Medical History:   Diagnosis Date   • CKD (chronic kidney disease) stage 3, GFR 30-59 ml/min ([facility])    • Glaucoma    • HLD (hyperlipidemia)    • HTN (hypertension)    • Hyperthyroidism          Medications:     Current Outpatient Medications:   •  acetaminophen (TYLENOL) 500 MG tablet, Take 1 tablet by mouth Every 6 (Six) Hours As Needed for Moderate Pain ., Disp: 30 tablet, Rfl: 0  •  amLODIPine (NORVASC) 10 MG tablet, Take 1 tablet by mouth Daily., Disp: 90 tablet, Rfl: 3  •  atorvastatin (LIPITOR) 40 MG tablet, Take 1 tablet by mouth Daily., Disp: 90 tablet, Rfl: 3  •  dorzolamide-timolol (COSOPT) 22.3-6.8  "MG/ML ophthalmic solution, Administer 1 drop to both eyes 2 (Two) Times a Day., Disp: 10 mL, Rfl: 5  •  latanoprost (XALATAN) 0.005 % ophthalmic solution, Administer 1 drop to both eyes every night at bedtime., Disp: 7.5 mL, Rfl: 5  •  lisinopril (PRINIVIL,ZESTRIL) 40 MG tablet, Take 1 tablet by mouth Daily., Disp: 30 tablet, Rfl: 2  •  sildenafil (VIAGRA) 100 MG tablet, TAKE 1 TABLET BY MOUTH DAILY AS NEEDED FOR ERECTILE DYSFUNCTION, Disp: 9 tablet, Rfl: 11    Allergies:   No Known Allergies      Objective     Physical Exam:  Vital Signs:   Vitals:    03/14/23 0945   BP: 117/82   Pulse: 78   Temp: 98.2 °F (36.8 °C)   TempSrc: Infrared   SpO2: 99%   Weight: 105 kg (232 lb 3.2 oz)   Height: 193 cm (75.98\")   PainSc: 0-No pain     Body mass index is 28.28 kg/m².     Physical Exam  Vitals and nursing note reviewed.   Constitutional:       General: He is not in acute distress.     Appearance: Normal appearance. He is well-developed. He is not ill-appearing, toxic-appearing or diaphoretic.   HENT:      Head: Normocephalic and atraumatic.   Cardiovascular:      Rate and Rhythm: Normal rate and regular rhythm.      Heart sounds: Normal heart sounds, S1 normal and S2 normal.   Pulmonary:      Effort: Pulmonary effort is normal. No respiratory distress.      Breath sounds: Normal breath sounds. No stridor. No wheezing.   Musculoskeletal:      Right lower leg: No edema.      Left lower leg: No edema.   Skin:     General: Skin is warm and dry.   Neurological:      General: No focal deficit present.      Mental Status: He is alert and oriented to person, place, and time.   Psychiatric:         Mood and Affect: Mood normal.         Behavior: Behavior normal. Behavior is cooperative.         Thought Content: Thought content normal.         Judgment: Judgment normal.         Assessment / Plan      Assessment/Plan:   Diagnoses and all orders for this visit:    1. Primary hypertension (Primary)  Assessment & Plan:  Hypertension is " improving with treatment.  Continue current treatment regimen.  Dietary sodium restriction.  Regular aerobic exercise.  Continue current medications.  Ambulatory blood pressure monitoring.  Blood pressure will be reassessed at the next regular appointment.    Orders:  -     lisinopril (PRINIVIL,ZESTRIL) 40 MG tablet; Take 1 tablet by mouth Daily.  Dispense: 30 tablet; Refill: 2    2. Stage 3a chronic kidney disease (HCC)  Assessment & Plan:  Renal condition is stable.  F/U with nephrologist.  Patient reports that he has not seen his urologist in over a year, but will schedule an appointment today.      3. Elevated PSA  Assessment & Plan:  PSA increasing.   Recommend f/u with urologist. Patient states that he has an upcoming appointment.           Follow Up:   PRN and at next scheduled appointment(s) with PCP.    Discussed the nature of the medical condition(s) risks, complications, implications, management, safe and proper use of medications. Encouraged medication compliance, and keeping scheduled follow up appointments with me and any other providers.      RTC if symptoms fail to improve, to ER if symptoms worsen.        *Dictated Utilizing Dragon Dictation   Please note that portions of this note were completed with a voice recognition program.   Part of this note may be an electronic transcription/translation of spoken language to printed text using the Dragon Dictation System. Spelling and/or grammatical errors may exist despite efforts at proofreading.        CHELSIE Egan  [facility] [place]

## 2023-03-19 NOTE — ASSESSMENT & PLAN NOTE
Renal condition is stable.  F/U with nephrologist.  Patient reports that he has not seen his urologist in over a year, but will schedule an appointment today.

## 2023-03-19 NOTE — ASSESSMENT & PLAN NOTE
PSA increasing.   Recommend f/u with urologist. Patient states that he has an upcoming appointment.

## 2023-04-21 DIAGNOSIS — I10 ESSENTIAL HYPERTENSION: ICD-10-CM

## 2023-04-21 DIAGNOSIS — Z76.0 MEDICATION REFILL: ICD-10-CM

## 2023-04-21 RX ORDER — AMLODIPINE BESYLATE 10 MG/1
10 TABLET ORAL DAILY
Qty: 90 TABLET | Refills: 3 | Status: SHIPPED | OUTPATIENT
Start: 2023-04-21

## 2023-04-21 NOTE — TELEPHONE ENCOUNTER
Caller: Aron Drisclol Reddy    Relationship: Self    Best call back number: [phone]    Requested Prescriptions:   Requested Prescriptions     Pending Prescriptions Disp Refills   • amLODIPine (NORVASC) 10 MG tablet 90 tablet 3     Sig: Take 1 tablet by mouth Daily.        Pharm[facility] be sent[id number] [address] AT [facility] [address]  - [phone]  - [phone] FX     Last office visit with prescribing clinician: 3/14/2023   Last telemedicine visit with prescribing clinician: Visit date not found   Next office visit with prescribing clinician: Visit date not found     Additional details provided by patient: PATIENT HAS A FEW DAYS LEFT ON THE MEDICATION     Does the patient have less than a 3 day supply:  [x] Yes  [] No    Would you like a call back once the refill request has been completed: [x] Yes [] No    If the office needs to give you a call back, can they leave a voicemail: [x] Yes [] No    Randell Blas Rep   04/21/23 13:07 EDT

## 2023-05-03 ENCOUNTER — TELEPHONE (OUTPATIENT)
Dept: FAMILY MEDICINE CLINIC | Facility: CLINIC | Age: 63
End: 2023-05-03

## 2023-05-03 DIAGNOSIS — E78.01 FAMILIAL HYPERCHOLESTEROLEMIA: ICD-10-CM

## 2023-05-03 DIAGNOSIS — Z76.0 MEDICATION REFILL: ICD-10-CM

## 2023-05-03 RX ORDER — ATORVASTATIN CALCIUM 40 MG/1
40 TABLET, FILM COATED ORAL DAILY
Qty: 90 TABLET | Refills: 0 | Status: SHIPPED | OUTPATIENT
Start: 2023-05-03

## 2023-05-03 RX ORDER — ATORVASTATIN CALCIUM 40 MG/1
40 TABLET, FILM COATED ORAL DAILY
Qty: 90 TABLET | Refills: 3 | Status: CANCELLED | OUTPATIENT
Start: 2023-05-03

## 2023-05-03 NOTE — TELEPHONE ENCOUNTER
Caller: Aron Driscoll Reddy    Relationship: Self    Best call back number: [phone]    Requested Prescriptions:   Requested Prescriptions     Pending Prescriptions Disp Refills   • atorvastatin (LIPITOR) 40 MG tablet 90 tablet 3     Sig: Take 1 tablet by mouth Daily.        Pharm[facility] where [facility] be sent: [id number] [address] AT [facility] [address]  - [phone]  - [phone] FX     Last office visit with prescribing clinician: 3/14/2023   Last telemedicine visit with prescribing clinician: Visit date not found   Next office visit with prescribing clinician: Visit date not found     Additional details provided by patient: 1 PILL LEFT    Does the patient have less than a 3 day supply:  [x] Yes  [] No    Would you like a call back once the refill request has been completed: [] Yes [x] No    If the office needs to give you a call back, can they leave a voicemail: [] Yes [x] No    Varun Chavez, CORRIE   05/03/23 12:15 EDT

## 2023-07-28 ENCOUNTER — OFFICE VISIT (OUTPATIENT)
Dept: CARDIOLOGY | Facility: CLINIC | Age: 63
End: 2023-07-28
Payer: COMMERCIAL

## 2023-07-28 VITALS
OXYGEN SATURATION: 99 % | HEIGHT: 74 IN | DIASTOLIC BLOOD PRESSURE: 94 MMHG | HEART RATE: 65 BPM | BODY MASS INDEX: 29.9 KG/M2 | WEIGHT: 233 LBS | SYSTOLIC BLOOD PRESSURE: 130 MMHG

## 2023-07-28 DIAGNOSIS — I11.9 HYPERTENSIVE HEART DISEASE WITHOUT HEART FAILURE: Primary | ICD-10-CM

## 2023-07-28 DIAGNOSIS — I10 ESSENTIAL HYPERTENSION: ICD-10-CM

## 2023-07-28 DIAGNOSIS — R00.2 PALPITATIONS: ICD-10-CM

## 2023-07-28 DIAGNOSIS — E78.2 MIXED HYPERLIPIDEMIA: ICD-10-CM

## 2023-07-28 DIAGNOSIS — I77.810 AORTIC ROOT DILATION: ICD-10-CM

## 2023-07-28 RX ORDER — NIFEDIPINE 30 MG/1
30 TABLET, EXTENDED RELEASE ORAL DAILY
COMMUNITY
Start: 2023-04-27

## 2023-07-28 RX ORDER — CHLORTHALIDONE 25 MG/1
25 TABLET ORAL DAILY
Qty: 30 TABLET | Refills: 11 | COMMUNITY
Start: 2023-04-27 | End: 2023-07-28 | Stop reason: SDUPTHER

## 2023-07-28 RX ORDER — CHLORTHALIDONE 25 MG/1
25 TABLET ORAL DAILY
Qty: 90 TABLET | Refills: 3 | Status: SHIPPED | OUTPATIENT
Start: 2023-07-28 | End: 2024-07-27

## 2023-08-03 ENCOUNTER — TELEPHONE (OUTPATIENT)
Dept: CARDIOLOGY | Facility: CLINIC | Age: 63
End: 2023-08-03
Payer: COMMERCIAL

## 2023-08-03 DIAGNOSIS — E78.2 MIXED HYPERLIPIDEMIA: Chronic | ICD-10-CM

## 2023-08-03 DIAGNOSIS — I10 PRIMARY HYPERTENSION: Primary | Chronic | ICD-10-CM

## 2023-08-08 ENCOUNTER — LAB (OUTPATIENT)
Dept: LAB | Facility: HOSPITAL | Age: 63
End: 2023-08-08
Payer: COMMERCIAL

## 2023-08-08 DIAGNOSIS — E78.2 MIXED HYPERLIPIDEMIA: Chronic | ICD-10-CM

## 2023-08-08 DIAGNOSIS — I10 PRIMARY HYPERTENSION: Chronic | ICD-10-CM

## 2023-08-08 LAB
ANION GAP SERPL CALCULATED.3IONS-SCNC: 10.5 MMOL/L (ref 5–15)
BUN SERPL-MCNC: 14 MG/DL (ref 8–23)
BUN/CREAT SERPL: 10.4 (ref 7–25)
CALCIUM SPEC-SCNC: 9.4 MG/DL (ref 8.6–10.5)
CHLORIDE SERPL-SCNC: 106 MMOL/L (ref 98–107)
CO2 SERPL-SCNC: 26.5 MMOL/L (ref 22–29)
CREAT SERPL-MCNC: 1.34 MG/DL (ref 0.76–1.27)
EGFRCR SERPLBLD CKD-EPI 2021: 60.3 ML/MIN/1.73
GLUCOSE SERPL-MCNC: 89 MG/DL (ref 65–99)
POTASSIUM SERPL-SCNC: 4.5 MMOL/L (ref 3.5–5.2)
SODIUM SERPL-SCNC: 143 MMOL/L (ref 136–145)

## 2023-08-08 PROCEDURE — 80048 BASIC METABOLIC PNL TOTAL CA: CPT

## 2023-08-08 PROCEDURE — 36415 COLL VENOUS BLD VENIPUNCTURE: CPT

## 2023-08-16 ENCOUNTER — OFFICE VISIT (OUTPATIENT)
Dept: FAMILY MEDICINE CLINIC | Facility: CLINIC | Age: 63
End: 2023-08-16
Payer: COMMERCIAL

## 2023-08-16 ENCOUNTER — LAB (OUTPATIENT)
Dept: LAB | Facility: HOSPITAL | Age: 63
End: 2023-08-16
Payer: COMMERCIAL

## 2023-08-16 VITALS
BODY MASS INDEX: 29.31 KG/M2 | WEIGHT: 228.4 LBS | HEART RATE: 87 BPM | HEIGHT: 74 IN | SYSTOLIC BLOOD PRESSURE: 123 MMHG | TEMPERATURE: 97.2 F | DIASTOLIC BLOOD PRESSURE: 76 MMHG | OXYGEN SATURATION: 98 %

## 2023-08-16 DIAGNOSIS — E78.2 MIXED HYPERLIPIDEMIA: Chronic | ICD-10-CM

## 2023-08-16 DIAGNOSIS — I10 PRIMARY HYPERTENSION: Chronic | ICD-10-CM

## 2023-08-16 DIAGNOSIS — N18.31 STAGE 3A CHRONIC KIDNEY DISEASE: Chronic | ICD-10-CM

## 2023-08-16 DIAGNOSIS — Z00.00 ANNUAL PHYSICAL EXAM: ICD-10-CM

## 2023-08-16 DIAGNOSIS — Z00.00 ANNUAL PHYSICAL EXAM: Primary | ICD-10-CM

## 2023-08-16 DIAGNOSIS — Z23 NEED FOR SHINGLES VACCINE: ICD-10-CM

## 2023-08-16 LAB
ALBUMIN SERPL-MCNC: 4.4 G/DL (ref 3.5–5.2)
ALBUMIN/GLOB SERPL: 1.3 G/DL
ALP SERPL-CCNC: 64 U/L (ref 39–117)
ALT SERPL W P-5'-P-CCNC: 45 U/L (ref 1–41)
ANION GAP SERPL CALCULATED.3IONS-SCNC: 11.1 MMOL/L (ref 5–15)
AST SERPL-CCNC: 31 U/L (ref 1–40)
BILIRUB SERPL-MCNC: 0.3 MG/DL (ref 0–1.2)
BUN SERPL-MCNC: 19 MG/DL (ref 8–23)
BUN/CREAT SERPL: 13.4 (ref 7–25)
CALCIUM SPEC-SCNC: 9.7 MG/DL (ref 8.6–10.5)
CHLORIDE SERPL-SCNC: 98 MMOL/L (ref 98–107)
CHOLEST SERPL-MCNC: 157 MG/DL (ref 0–200)
CO2 SERPL-SCNC: 29.9 MMOL/L (ref 22–29)
CREAT SERPL-MCNC: 1.42 MG/DL (ref 0.76–1.27)
DEPRECATED RDW RBC AUTO: 38.6 FL (ref 37–54)
EGFRCR SERPLBLD CKD-EPI 2021: 56.2 ML/MIN/1.73
ERYTHROCYTE [DISTWIDTH] IN BLOOD BY AUTOMATED COUNT: 13.2 % (ref 12.3–15.4)
GLOBULIN UR ELPH-MCNC: 3.5 GM/DL
GLUCOSE SERPL-MCNC: 83 MG/DL (ref 65–99)
HCT VFR BLD AUTO: 41.8 % (ref 37.5–51)
HDLC SERPL-MCNC: 28 MG/DL (ref 40–60)
HGB BLD-MCNC: 13.8 G/DL (ref 13–17.7)
LDLC SERPL CALC-MCNC: 111 MG/DL (ref 0–100)
LDLC/HDLC SERPL: 3.94 {RATIO}
MCH RBC QN AUTO: 27.2 PG (ref 26.6–33)
MCHC RBC AUTO-ENTMCNC: 33 G/DL (ref 31.5–35.7)
MCV RBC AUTO: 82.4 FL (ref 79–97)
PLATELET # BLD AUTO: 320 10*3/MM3 (ref 140–450)
PMV BLD AUTO: 10.2 FL (ref 6–12)
POTASSIUM SERPL-SCNC: 3.9 MMOL/L (ref 3.5–5.2)
PROT SERPL-MCNC: 7.9 G/DL (ref 6–8.5)
RBC # BLD AUTO: 5.07 10*6/MM3 (ref 4.14–5.8)
SODIUM SERPL-SCNC: 139 MMOL/L (ref 136–145)
TRIGL SERPL-MCNC: 94 MG/DL (ref 0–150)
TSH SERPL DL<=0.05 MIU/L-ACNC: 1.49 UIU/ML (ref 0.27–4.2)
VLDLC SERPL-MCNC: 18 MG/DL (ref 5–40)
WBC NRBC COR # BLD: 5.02 10*3/MM3 (ref 3.4–10.8)

## 2023-08-16 PROCEDURE — 80061 LIPID PANEL: CPT

## 2023-08-16 PROCEDURE — 80050 GENERAL HEALTH PANEL: CPT

## 2023-08-16 NOTE — PROGRESS NOTES
Follow Up Office Note     Patient Name: Aron Driscoll  : 1960   MRN: [id number]     Chief Complaint:    Chief Complaint   Patient presents with    Annual Exam       History of Present Illness: Aron Driscoll is a 62 y.o. male who presents today for annual physical exam. Patient describes his overall health as good. He has no complaints or health concerns today.     Patient has chronic CKD for which he follows with  Nephrology. His last visit was 23.    Patient also has elevated PSA for which he follows with  Urology. He has an appointment  of this year for follow-up.    Subjective      I have reviewed and the following portions of the patient's history were updated as appropriate: past family history, past medical history, past social history, past surgical history and problem list.    Review of Systems:   Review of Systems   Constitutional:  Negative for activity change, appetite change, chills, diaphoresis, fatigue and fever.   HENT:  Negative for congestion, ear pain, sinus pain, sore throat and trouble swallowing.    Eyes: Negative.    Respiratory:  Negative for cough, chest tightness and shortness of breath.    Cardiovascular:  Negative for chest pain, palpitations and leg swelling.   Gastrointestinal:  Negative for abdominal pain, diarrhea, nausea and vomiting.   Genitourinary:  Negative for dysuria, flank pain and hematuria.   Musculoskeletal:  Negative for arthralgias and myalgias.   Skin:  Negative for pallor.   Neurological:  Negative for dizziness and headaches.   Psychiatric/Behavioral: Negative.        Past Medical History:   Past Medical History:   Diagnosis Date    CKD (chronic kidney disease) stage 3, GFR 30-59 ml/min     Glaucoma     HLD (hyperlipidemia)     HTN (hypertension)     Hyperthyroidism          Medications:     Current Outpatient Medications:     acetaminophen (TYLENOL) 500 MG tablet, Take 1 tablet by mouth Every 6 (Six) Hours As Needed  "for Moderate Pain ., Disp: 30 tablet, Rfl: 0    atorvastatin (LIPITOR) 40 MG tablet, Take 1 tablet by mouth Daily., Disp: 90 tablet, Rfl: 0    chlorthalidone (HYGROTON) 25 MG tablet, Take 1 tablet by mouth Daily., Disp: 90 tablet, Rfl: 3    dorzolamide-timolol (COSOPT) 22.3-6.8 MG/ML ophthalmic solution, Administer 1 drop to both eyes 2 (Two) Times a Day., Disp: 10 mL, Rfl: 5    latanoprost (XALATAN) 0.005 % ophthalmic solution, Administer 1 drop to both eyes every night at bedtime., Disp: 7.5 mL, Rfl: 5    lisinopril (PRINIVIL,ZESTRIL) 40 MG tablet, Take 1 tablet by mouth Daily., Disp: 30 tablet, Rfl: 2    NIFEdipine XL (PROCARDIA XL) 30 MG 24 hr tablet, Take 1 tablet by mouth Daily., Disp: , Rfl:     sildenafil (VIAGRA) 100 MG tablet, TAKE 1 TABLET BY MOUTH DAILY AS NEEDED FOR ERECTILE DYSFUNCTION, Disp: 9 tablet, Rfl: 11    Allergies:   No Known Allergies        Objective     Physical Exam:  Vital Signs:   Vitals:    08/16/23 1052   BP: 123/76   Pulse: 87   Temp: 97.2 øF (36.2 øC)   TempSrc: Infrared   SpO2: 98%   Weight: 104 kg (228 lb 6.4 oz)   Height: 188 cm (74.02\")   PainSc: 0-No pain     Body mass index is 29.31 kg/mý.     Physical Exam  Vitals and nursing note reviewed. Exam conducted with a chaperone present.   Constitutional:       General: He is not in acute distress.     Appearance: Normal appearance. He is well-developed and well-groomed. He is not ill-appearing, toxic-appearing or diaphoretic.   HENT:      Head: Normocephalic and atraumatic.      Right Ear: Tympanic membrane, ear canal and external ear normal.      Left Ear: Tympanic membrane, ear canal and external ear normal.      Nose: Nose normal.      Mouth/Throat:      Lips: Pink.      Mouth: Mucous membranes are moist.      Pharynx: Oropharynx is clear. Uvula midline. No posterior oropharyngeal erythema.   Neck:      Thyroid: No thyroid mass, thyromegaly or thyroid tenderness.   Cardiovascular:      Rate and Rhythm: Normal rate and regular " rhythm.      Pulses: Normal pulses.      Heart sounds: Normal heart sounds, S1 normal and S2 normal. No murmur heard.  Pulmonary:      Effort: Pulmonary effort is normal. No respiratory distress.      Breath sounds: Normal breath sounds.   Abdominal:      General: Bowel sounds are normal. There is no distension.      Palpations: Abdomen is soft.      Tenderness: There is no abdominal tenderness.   Musculoskeletal:         General: Normal range of motion.      Cervical back: Normal range of motion and neck supple.      Right lower leg: No edema.      Left lower leg: No edema.   Lymphadenopathy:      Cervical: No cervical adenopathy.   Skin:     General: Skin is warm and dry.      Capillary Refill: Capillary refill takes less than 2 seconds.      Findings: No rash.   Neurological:      General: No focal deficit present.      Mental Status: He is alert and oriented to person, place, and time.   Psychiatric:         Attention and Perception: Attention and perception normal.         Mood and Affect: Mood and affect normal.         Speech: Speech normal.         Behavior: Behavior normal. Behavior is cooperative.         Thought Content: Thought content normal.         Cognition and Memory: Cognition and memory normal.         Judgment: Judgment normal.       Assessment / Plan      Assessment/Plan:   Diagnoses and all orders for this visit:    1. Annual physical exam (Primary)  -     Lipid Panel; Future  -     Comprehensive Metabolic Panel; Future  -     CBC (No Diff); Future  -     TSH; Future    2. Primary hypertension  -     Comprehensive Metabolic Panel; Future  -     CBC (No Diff); Future  -     TSH; Future    3. Stage 3a chronic kidney disease  -     Comprehensive Metabolic Panel; Future    4. Mixed hyperlipidemia  -     Lipid Panel; Future    5. Need for shingles vaccine  -     Shingrix Vaccine       The patient is here for a health maintenance visit.  Currently, the patient consumes a mostly healthy diet and has  an adequate exercise regimen. Screening lab work is ordered.  Immunizations are declined today and vaccine education is provided.  Advice and education is given regarding nutrition, aerobic exercise, routine dental evaluations, routine eye exams, reproductive health, cardiovascular risk reduction, sunscreen use, self skin examination (annual dermatology evaluations) and seat belt use (general overall safety).  Further recommendations after lab evaluation.  Annual wellness evaluations recommended.     Follow Up:   PRN and at next scheduled appointment(s) with PCP.    Discussed the nature of the medical condition(s) risks, complications, implications, management, safe and proper use of medications. Encouraged medication compliance, and keeping scheduled follow up appointments with me and any other providers.          *Dictated Utilizing Dragon Dictation   Please note that portions of this note were completed with a voice recognition program.   Part of this note may be an electronic transcription/translation of spoken language to printed text using the Dragon Dictation System. Spelling and/or grammatical errors may exist despite efforts at proofreading.      NOTE TO PATIENT: The 21st Century Cures Act makes medical notes like these available to patients in the interest of transparency. However, be advised this is a medical document. It is intended as peer to peer communication. It is written in medical language and may contain abbreviations or verbiage that are unfamiliar. It may appear blunt or direct. Medical documents are intended to carry relevant information, facts as evident, and the clinical opinion of the practitioner.      CHELSIE Egan  [facility] [place]

## 2023-08-21 ENCOUNTER — TELEPHONE (OUTPATIENT)
Dept: CARDIOLOGY | Facility: CLINIC | Age: 63
End: 2023-08-21
Payer: COMMERCIAL

## 2023-08-21 NOTE — TELEPHONE ENCOUNTER
"Called patient to relay Holter results. No answer, left voicemail to call back.    \"SR, Occasional PVC, Frequent PAC, Rare PAT/SVT up to 6 beats, no definite correlation\"    Patient called back and relayed information. Patient verbalized understanding.           "

## 2024-03-27 PROBLEM — R00.2 PALPITATIONS: Status: ACTIVE | Noted: 2024-03-27

## 2024-03-27 NOTE — PROGRESS NOTES
[facility]    Encounter Date: 2024    Patient ID: Aron Driscoll is a 63 y.o. male.  : 1960     PCP: Julia Moreau APRN       Chief Complaint: Hypertensive heart disease without heart failure      PROBLEM LIST:  Hypertensive heart disease:  Echo, 2020: EF 56-60% with mild concentric LVH. Mild-to-moderate MR, mild TR, RVSP < 35 mmHg.    Echo, 2020: EF 55%. Mild to moderate MR. Mild TR. Mild dilation of aortic root.   Cardiolite, 2020: EF 56%. No evidence of ischemia. Low risk study.   Echo, 2022: EF 60%. Mild MR and TR with normal RVSP. Mildly dilated aorta at the level of sinuses of Valsalva (4.2 cm).  First-degree AV block on ECG.  Palpitations:  72h Holter, 2020: SR with 1.5% PACs and 0.2% PVCs. Rare short SVT.  Holter, 2023: SR. Occasional PVCs. Frequent PACs. Rare PAT/SVT upto 6 beats. No definite correlation.  Hypertension.  Hyperlipidemia.  Aortic root dilation:  Echo, 2020: Mild dilation of the aortic root (4.2 cm).  Family history of heart disease.  Chronic kidney disease, stage II.    History of Present Illness  Patient presents today for a follow-up with a history of chest pain, hypertensive heart disease without heart failure, aortic root dilation, and cardiac risk factors. Since last visit, the patient has been doing well from cardiac standpoint.  He monitors his blood pressure occasionally and states that it is never more than 120.  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  Denies current chest pain shortness of breath edema palpitations dizziness or syncope.         No Known Allergies        Current Outpatient Medications:     acetaminophen (TYLENOL) 500 MG tablet, Take 1 tablet by mouth Every 6 (Six) Hours As Needed for Moderate Pain ., Disp: 30 tablet, Rfl: 0    atorvastatin (LIPITOR) 40 MG tablet, Take 1 tablet by mouth Daily., Disp: 90 tablet,  "Rfl: 3    chlorthalidone (HYGROTON) 25 MG tablet, Take 1 tablet by mouth Daily., Disp: 90 tablet, Rfl: 3    dorzolamide-timolol (COSOPT) 22.3-6.8 MG/ML ophthalmic solution, Administer 1 drop to both eyes 2 (Two) Times a Day., Disp: 10 mL, Rfl: 5    latanoprost (XALATAN) 0.005 % ophthalmic solution, Administer 1 drop to both eyes every night at bedtime., Disp: 7.5 mL, Rfl: 5    lisinopril (PRINIVIL,ZESTRIL) 40 MG tablet, Take 1 tablet by mouth Daily., Disp: 90 tablet, Rfl: 3    NIFEdipine XL (PROCARDIA XL) 30 MG 24 hr tablet, Take 1 tablet by mouth Daily., Disp: 90 tablet, Rfl: 3    sildenafil (VIAGRA) 100 MG tablet, TAKE 1 TABLET BY MOUTH DAILY AS NEEDED FOR ERECTILE DYSFUNCTION (Patient not taking: Reported on 3/29/2024), Disp: 9 tablet, Rfl: 11    The following portions of the patient's history were reviewed and updated as appropriate: allergies, current medications, past family history, past medical history, past social history, past surgical history and problem list.    ROS  Review of Systems   14 point ROS negative except for that listed in the HPI.         Objective:     /78 (BP Location: Right arm, Patient Position: Sitting)   Pulse 78   Ht 188 cm (74\")   Wt 108 kg (238 lb)   SpO2 98%   BMI 30.56 kg/m²      Physical Exam  Constitutional: Patient appears well-developed and well-nourished.   HENT: HEENT exam unremarkable.   Neck: Neck supple. No JVD present. No carotid bruits.   Cardiovascular: Normal rate, regular rhythm and normal heart sounds. No murmur heard.   2+ symmetric pulses.   Pulmonary/Chest: Breath sounds normal. Does not exhibit tenderness.   Abdominal: Abdomen benign.   Musculoskeletal: Does not exhibit edema.   Neurological: Neurological exam unremarkable.   Vitals reviewed.    Data Review:   Lab Results   Component Value Date    GLUCOSE 83 08/16/2023    BUN 19 08/16/2023    CREATININE 1.42 (H) 08/16/2023    EGFR 56.2 (L) 08/16/2023    BCR 13.4 08/16/2023     08/16/2023    K 3.9 " 08/16/2023    CO2 29.9 (H) 08/16/2023    CALCIUM 9.7 08/16/2023    ALBUMIN 4.4 08/16/2023    AST 31 08/16/2023    ALT 45 (H) 08/16/2023     Lab Results   Component Value Date    CHOL 157 08/16/2023    TRIG 94 08/16/2023    HDL 28 (L) 08/16/2023     (H) 08/16/2023      Lab Results   Component Value Date    WBC 5.02 08/16/2023    RBC 5.07 08/16/2023    HGB 13.8 08/16/2023    HCT 41.8 08/16/2023    MCV 82.4 08/16/2023     08/16/2023     Lab Results   Component Value Date    TSH 1.490 08/16/2023        Procedures       Advance Care Planning   ACP discussion was declined by the patient. Patient does not have an advance directive, declines further assistance.           Assessment:      Diagnosis Plan   1. Hypertensive heart disease without heart failure  Continue to manage hypertension.      2. Aortic root dilation  Continue to manage hypertension, aortic dimension has remained stable on follow-up echocardiogram will consider future follow-up after next visit.      3. Palpitations  Unremarkable cardiac monitor results discussed with the patient.  Continue avoid caffeine and maintain good hydration.      4. Essential hypertension  Blood pressure is well-controlled, continue chlorthalidone, lisinopril and nifedipine.      5. Dyslipidemia  Monitored by PCP, continue current statin therapy     Plan:   Stable cardiac status.  No current angina or CHF symptoms.  Avoid caffeine, maintain good hydration.  Heart healthy diet and regular exercise recommended.  Continue current medications.   FU in 1 year with same-day echocardiogram.    Thank you for allowing us to participate in the care of your patient.       I, Bel Umana MD, personally performed the services described in this documentation as scribed by the above named individual in my presence, and it is both accurate and complete.  3/29/2024  11:46 EDT      Please note that portions of this note may have been completed with a voice recognition program. Efforts  were made to edit the dictations, but occasionally words are mistranscribed.

## 2024-03-29 ENCOUNTER — OFFICE VISIT (OUTPATIENT)
Dept: CARDIOLOGY | Facility: CLINIC | Age: 64
End: 2024-03-29
Payer: COMMERCIAL

## 2024-03-29 VITALS
SYSTOLIC BLOOD PRESSURE: 110 MMHG | OXYGEN SATURATION: 98 % | WEIGHT: 238 LBS | HEART RATE: 78 BPM | HEIGHT: 74 IN | BODY MASS INDEX: 30.54 KG/M2 | DIASTOLIC BLOOD PRESSURE: 78 MMHG

## 2024-03-29 DIAGNOSIS — Z76.0 MEDICATION REFILL: ICD-10-CM

## 2024-03-29 DIAGNOSIS — I10 ESSENTIAL HYPERTENSION: ICD-10-CM

## 2024-03-29 DIAGNOSIS — R00.2 PALPITATIONS: ICD-10-CM

## 2024-03-29 DIAGNOSIS — I10 PRIMARY HYPERTENSION: Chronic | ICD-10-CM

## 2024-03-29 DIAGNOSIS — I11.9 HYPERTENSIVE HEART DISEASE WITHOUT HEART FAILURE: Primary | ICD-10-CM

## 2024-03-29 DIAGNOSIS — E78.5 DYSLIPIDEMIA: ICD-10-CM

## 2024-03-29 DIAGNOSIS — I77.810 AORTIC ROOT DILATION: ICD-10-CM

## 2024-03-29 RX ORDER — NIFEDIPINE 30 MG/1
30 TABLET, EXTENDED RELEASE ORAL DAILY
Qty: 90 TABLET | Refills: 3 | Status: SHIPPED | OUTPATIENT
Start: 2024-03-29

## 2024-03-29 RX ORDER — CHLORTHALIDONE 25 MG/1
25 TABLET ORAL DAILY
Qty: 90 TABLET | Refills: 3 | Status: SHIPPED | OUTPATIENT
Start: 2024-03-29 | End: 2025-03-29

## 2024-03-29 RX ORDER — LISINOPRIL 40 MG/1
40 TABLET ORAL DAILY
Qty: 90 TABLET | Refills: 3 | Status: SHIPPED | OUTPATIENT
Start: 2024-03-29

## 2024-03-29 RX ORDER — ATORVASTATIN CALCIUM 40 MG/1
40 TABLET, FILM COATED ORAL DAILY
Qty: 90 TABLET | Refills: 3 | Status: SHIPPED | OUTPATIENT
Start: 2024-03-29

## 2024-09-04 DIAGNOSIS — R00.2 PALPITATIONS: ICD-10-CM

## 2024-09-04 DIAGNOSIS — N18.31 STAGE 3A CHRONIC KIDNEY DISEASE: Primary | Chronic | ICD-10-CM

## 2024-09-04 RX ORDER — CHLORTHALIDONE 25 MG/1
25 TABLET ORAL DAILY
Qty: 90 TABLET | Refills: 0 | Status: SHIPPED | OUTPATIENT
Start: 2024-09-04 | End: 2025-09-04

## 2024-12-02 DIAGNOSIS — I11.9 HYPERTENSIVE HEART DISEASE WITHOUT HEART FAILURE: Primary | ICD-10-CM

## 2025-03-31 ENCOUNTER — HOSPITAL ENCOUNTER (OUTPATIENT)
Dept: CARDIOLOGY | Facility: HOSPITAL | Age: 65
Discharge: HOME | End: 2025-03-31
Admitting: INTERNAL MEDICINE
Payer: COMMERCIAL

## 2025-03-31 VITALS — HEIGHT: 74 IN | BODY MASS INDEX: 30.54 KG/M2 | WEIGHT: 238 LBS

## 2025-03-31 DIAGNOSIS — I11.9 HYPERTENSIVE HEART DISEASE WITHOUT HEART FAILURE: ICD-10-CM

## 2025-03-31 LAB
AORTIC DIMENSIONLESS INDEX: 0.75 (DI)
ASCENDING AORTA: 3.3 CM
AV MEAN PRESS GRAD SYS DOP V1V2: 3 MMHG
AV VMAX SYS DOP: 120.5 CM/SEC
BH CV ECHO MEAS - AO MAX PG: 5.8 MMHG
BH CV ECHO MEAS - AO ROOT AREA (BSA CORRECTED): 1.8 CM2
BH CV ECHO MEAS - AO ROOT DIAM: 4.3 CM
BH CV ECHO MEAS - AO V2 VTI: 22.4 CM
BH CV ECHO MEAS - AVA(I,D): 2.8 CM2
BH CV ECHO MEAS - EDV(CUBED): 94.2 ML
BH CV ECHO MEAS - EDV(MOD-SP2): 133 ML
BH CV ECHO MEAS - EDV(MOD-SP4): 136 ML
BH CV ECHO MEAS - EF(MOD-SP2): 59.5 %
BH CV ECHO MEAS - EF(MOD-SP4): 65.5 %
BH CV ECHO MEAS - ESV(CUBED): 18.6 ML
BH CV ECHO MEAS - ESV(MOD-SP2): 53.9 ML
BH CV ECHO MEAS - ESV(MOD-SP4): 46.9 ML
BH CV ECHO MEAS - FS: 41.8 %
BH CV ECHO MEAS - IVS/LVPW: 1.1 CM
BH CV ECHO MEAS - IVSD: 1.15 CM
BH CV ECHO MEAS - LA DIMENSION: 4.1 CM
BH CV ECHO MEAS - LAT PEAK E' VEL: 8.7 CM/SEC
BH CV ECHO MEAS - LV DIASTOLIC VOL/BSA (35-75): 58.1 CM2
BH CV ECHO MEAS - LV MASS(C)D: 178.1 GRAMS
BH CV ECHO MEAS - LV MAX PG: 3.4 MMHG
BH CV ECHO MEAS - LV MEAN PG: 1.5 MMHG
BH CV ECHO MEAS - LV SYSTOLIC VOL/BSA (12-30): 20 CM2
BH CV ECHO MEAS - LV V1 MAX: 90.5 CM/SEC
BH CV ECHO MEAS - LV V1 VTI: 16.7 CM
BH CV ECHO MEAS - LVIDD: 4.6 CM
BH CV ECHO MEAS - LVIDS: 2.7 CM
BH CV ECHO MEAS - LVOT AREA: 3.8 CM2
BH CV ECHO MEAS - LVOT DIAM: 2.2 CM
BH CV ECHO MEAS - LVPWD: 1.05 CM
BH CV ECHO MEAS - MED PEAK E' VEL: 6.9 CM/SEC
BH CV ECHO MEAS - MV A MAX VEL: 59.2 CM/SEC
BH CV ECHO MEAS - MV DEC SLOPE: 272 CM/SEC2
BH CV ECHO MEAS - MV DEC TIME: 0.27 SEC
BH CV ECHO MEAS - MV E MAX VEL: 64.4 CM/SEC
BH CV ECHO MEAS - MV E/A: 1.09
BH CV ECHO MEAS - MV P1/2T: 77.4 MSEC
BH CV ECHO MEAS - MVA(P1/2T): 2.8 CM2
BH CV ECHO MEAS - PA ACC TIME: 0.13 SEC
BH CV ECHO MEAS - PA V2 MAX: 101.3 CM/SEC
BH CV ECHO MEAS - PAPD(PI EDV): 5 MMHG
BH CV ECHO MEAS - PI END-D VEL: 112 CM/SEC
BH CV ECHO MEAS - RAP SYSTOLE: 3 MMHG
BH CV ECHO MEAS - RVSP: 19 MMHG
BH CV ECHO MEAS - SV(LVOT): 63.5 ML
BH CV ECHO MEAS - SV(MOD-SP2): 79.1 ML
BH CV ECHO MEAS - SV(MOD-SP4): 89.1 ML
BH CV ECHO MEAS - SVI(LVOT): 27.1 ML/M2
BH CV ECHO MEAS - SVI(MOD-SP2): 33.8 ML/M2
BH CV ECHO MEAS - SVI(MOD-SP4): 38.1 ML/M2
BH CV ECHO MEAS - TAPSE (>1.6): 2.7 CM
BH CV ECHO MEAS - TR MAX PG: 15.8 MMHG
BH CV ECHO MEAS - TR MAX VEL: 199 CM/SEC
BH CV ECHO MEASUREMENTS AVERAGE E/E' RATIO: 8.26
BH CV VAS BP LEFT ARM: NORMAL MMHG
BH CV XLRA - RV BASE: 4.9 CM
BH CV XLRA - RV LENGTH: 7.8 CM
BH CV XLRA - RV MID: 4 CM
BH CV XLRA - TDI S': 13.1 CM/SEC
IVRT: 81 MS
LEFT ATRIUM VOLUME INDEX: 27.6 ML/M2
LV EF 2D ECHO EST: 65 %
LV EF BIPLANE MOD: 63.9 %

## 2025-03-31 PROCEDURE — 93306 TTE W/DOPPLER COMPLETE: CPT

## 2025-03-31 PROCEDURE — 93306 TTE W/DOPPLER COMPLETE: CPT | Performed by: INTERNAL MEDICINE

## 2025-04-01 NOTE — PROGRESS NOTES
[facility]    Encounter Date: 2025    Patient ID: Aron Driscoll is a 64 y.o. male.  : 1960     PCP: Julia Moreau APRN       Chief Complaint: Hypertensive heart disease without heart failure      PROBLEM LIST:  Hypertensive heart disease:  Echo, 2020: EF 56-60% with mild concentric LVH. Mild-to-moderate MR, mild TR, RVSP < 35 mmHg.    Echo, 2020: EF 55%. Mild to moderate MR. Mild TR. Mild dilation of aortic root.   Cardiolite, 2020: EF 56%. No evidence of ischemia. Low risk study.   Echo, 2022: EF 60%. Mild MR and TR with normal RVSP. Mildly dilated aorta at the level of sinuses of Valsalva (4.2 cm).  Echo, 2025: EF 65%. Mild MR. Trace to mild TR with normal RVSP. Mild dilation of the aortic root (4.3 cm) is present.   First-degree AV block on ECG.  Palpitations:  72h Holter, 2020: SR with 1.5% PACs and 0.2% PVCs. Rare short SVT.  Holter, 2023: SR. Occasional PVCs. Frequent PACs. Rare PAT/SVT upto 6 beats. No definite correlation.  Hypertension.  Hyperlipidemia.  Aortic root dilation:  Echo, 2020: Mild dilation of the aortic root (4.2 cm).  Echo 3/31/2025: Aortic root 4.3 cm  Family history of heart disease.  Chronic kidney disease, stage II.    History of Present Illness  Patient presents today for a 1 year follow-up with a history of hypertensive heart disease without heart failure, aortic root dilation, palpitations, and cardiac risk factors. Since last visit, the patient has done very well from cardiac standpoint.  States that he mostly feels well once in a while after a busy day he feels little tired.  No complaints of chest pain or shortness of breath.  Rarely experiences brief palpitations described as skipped beats which are self-limiting.  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  Denies cigarette smoking alcohol or substance abuse.  He is a former smoker and has not smoked or drank alcohol in a  "long time.    No Known Allergies      Current Outpatient Medications:     acetaminophen (TYLENOL) 500 MG tablet, Take 1 tablet by mouth Every 6 (Six) Hours As Needed for Moderate Pain ., Disp: 30 tablet, Rfl: 0    atorvastatin (LIPITOR) 40 MG tablet, Take 1 tablet by mouth Daily., Disp: 90 tablet, Rfl: 3    chlorthalidone (HYGROTON) 25 MG tablet, Take 1 tablet by mouth Daily. Need labs for future refill!, Disp: 90 tablet, Rfl: 0    dorzolamide-timolol (COSOPT) 22.3-6.8 MG/ML ophthalmic solution, Administer 1 drop to both eyes 2 (Two) Times a Day., Disp: 10 mL, Rfl: 5    latanoprost (XALATAN) 0.005 % ophthalmic solution, Administer 1 drop to both eyes every night at bedtime., Disp: 7.5 mL, Rfl: 5    lisinopril (PRINIVIL,ZESTRIL) 40 MG tablet, Take 1 tablet by mouth Daily., Disp: 90 tablet, Rfl: 3    NIFEdipine XL (PROCARDIA XL) 60 MG 24 hr tablet, Take 1 tablet by mouth Daily., Disp: , Rfl:     sildenafil (VIAGRA) 100 MG tablet, TAKE 1 TABLET BY MOUTH DAILY AS NEEDED FOR ERECTILE DYSFUNCTION, Disp: 9 tablet, Rfl: 11    The following portions of the patient's history were reviewed and updated as appropriate: allergies, current medications, past family history, past medical history, past social history, past surgical history and problem list.    ROS  Review of Systems   14 point ROS negative except for that listed in the HPI.         Objective:     /68 (BP Location: Right arm, Patient Position: Sitting)   Pulse 76   Ht 188 cm (74\")   Wt 107 kg (236 lb 12.8 oz)   SpO2 99%   BMI 30.40 kg/m²      Physical Exam  General: No apparent distress.  Neck: no JVD.  Chest:No respiratory distress, breath sounds are normal. No wheezes,  rhonchi or rales.  Cardiovascular: Normal S1 and S2, no murmur, gallop or rub.    Extremities: No edema.      Data Review:     Lab Results   Component Value Date    WBC 3.54 (L) 02/18/2025    RBC 5.05 02/18/2025    HGB 14 02/18/2025    HCT 42.3 02/18/2025    MCV 84 02/18/2025     " 02/18/2025        Procedures     Advance Care Planning   ACP discussion was held with the patient during this visit. Patient does not have an advance directive, declines further assistance.           Assessment:      Diagnosis Plan   1. Hypertensive heart disease without heart failure  Stable, continue current antihypertensive therapy with lisinopril, nifedipine and chlorthalidone.      2. Aortic root dilation  Aortic root measured 4.3 cm on recent echocardiogram, continue antihypertensive therapy, reassess with echo in 1 year.      3. Palpitations  Stable/controlled, unremarkable previous cardiac monitor, maintain good hydration, avoid caffeinated beverages      4. Essential hypertension  Continue current antihypertensive therapy as listed above          5. Dyslipidemia  Continue Lipitor, check lipid panel and CMP.            Plan:   Stable cardiac status.  No angina or CHF symptoms  Check lipid panel and CMP  Continue current medications.   FU in 12 MO, sooner as needed.  Thank you for allowing us to participate in the care of your patient.     Bel Umana MD, FACC, [facility]      Please note that portions of this note may have been completed with a voice recognition program. Efforts were made to edit the dictations, but occasionally words are mistranscribed.

## 2025-04-02 ENCOUNTER — RESULTS FOLLOW-UP (OUTPATIENT)
Dept: CARDIOLOGY | Facility: CLINIC | Age: 65
End: 2025-04-02

## 2025-04-02 ENCOUNTER — OFFICE VISIT (OUTPATIENT)
Dept: CARDIOLOGY | Facility: CLINIC | Age: 65
End: 2025-04-02
Payer: COMMERCIAL

## 2025-04-02 ENCOUNTER — LAB (OUTPATIENT)
Dept: LAB | Facility: HOSPITAL | Age: 65
End: 2025-04-02
Payer: COMMERCIAL

## 2025-04-02 VITALS
OXYGEN SATURATION: 99 % | DIASTOLIC BLOOD PRESSURE: 68 MMHG | HEIGHT: 74 IN | SYSTOLIC BLOOD PRESSURE: 116 MMHG | BODY MASS INDEX: 30.39 KG/M2 | WEIGHT: 236.8 LBS | HEART RATE: 76 BPM

## 2025-04-02 DIAGNOSIS — I77.810 AORTIC ROOT DILATION: ICD-10-CM

## 2025-04-02 DIAGNOSIS — E78.5 DYSLIPIDEMIA: ICD-10-CM

## 2025-04-02 DIAGNOSIS — R00.2 PALPITATIONS: ICD-10-CM

## 2025-04-02 DIAGNOSIS — I10 ESSENTIAL HYPERTENSION: ICD-10-CM

## 2025-04-02 DIAGNOSIS — I11.9 HYPERTENSIVE HEART DISEASE WITHOUT HEART FAILURE: Primary | ICD-10-CM

## 2025-04-02 LAB
ALBUMIN SERPL-MCNC: 4.4 G/DL (ref 3.5–5.2)
ALBUMIN/GLOB SERPL: 1.5 G/DL
ALP SERPL-CCNC: 67 U/L (ref 39–117)
ALT SERPL W P-5'-P-CCNC: 20 U/L (ref 1–41)
ANION GAP SERPL CALCULATED.3IONS-SCNC: 9.7 MMOL/L (ref 5–15)
AST SERPL-CCNC: 13 U/L (ref 1–40)
BILIRUB SERPL-MCNC: 0.3 MG/DL (ref 0–1.2)
BUN SERPL-MCNC: 19 MG/DL (ref 8–23)
BUN/CREAT SERPL: 12.3 (ref 7–25)
CALCIUM SPEC-SCNC: 9.4 MG/DL (ref 8.6–10.5)
CHLORIDE SERPL-SCNC: 102 MMOL/L (ref 98–107)
CHOLEST SERPL-MCNC: 224 MG/DL (ref 0–200)
CO2 SERPL-SCNC: 29.3 MMOL/L (ref 22–29)
CREAT SERPL-MCNC: 1.54 MG/DL (ref 0.76–1.27)
EGFRCR SERPLBLD CKD-EPI 2021: 50.4 ML/MIN/1.73
GLOBULIN UR ELPH-MCNC: 3 GM/DL
GLUCOSE SERPL-MCNC: 90 MG/DL (ref 65–99)
HDLC SERPL-MCNC: 33 MG/DL (ref 40–60)
LDLC SERPL CALC-MCNC: 171 MG/DL (ref 0–100)
LDLC/HDLC SERPL: 5.13 {RATIO}
POTASSIUM SERPL-SCNC: 3.6 MMOL/L (ref 3.5–5.2)
PROT SERPL-MCNC: 7.4 G/DL (ref 6–8.5)
SODIUM SERPL-SCNC: 141 MMOL/L (ref 136–145)
TRIGL SERPL-MCNC: 108 MG/DL (ref 0–150)
VLDLC SERPL-MCNC: 20 MG/DL (ref 5–40)

## 2025-04-02 PROCEDURE — 36415 COLL VENOUS BLD VENIPUNCTURE: CPT | Performed by: INTERNAL MEDICINE

## 2025-04-02 PROCEDURE — 99214 OFFICE O/P EST MOD 30 MIN: CPT | Performed by: INTERNAL MEDICINE

## 2025-04-02 PROCEDURE — 80061 LIPID PANEL: CPT | Performed by: INTERNAL MEDICINE

## 2025-04-02 PROCEDURE — 80053 COMPREHEN METABOLIC PANEL: CPT | Performed by: INTERNAL MEDICINE

## 2025-04-02 RX ORDER — NIFEDIPINE 60 MG/1
60 TABLET, EXTENDED RELEASE ORAL DAILY
COMMUNITY
Start: 2025-02-04

## 2025-04-03 DIAGNOSIS — E78.5 DYSLIPIDEMIA: Primary | ICD-10-CM

## 2025-04-03 DIAGNOSIS — Z76.0 MEDICATION REFILL: ICD-10-CM

## 2025-04-03 RX ORDER — ATORVASTATIN CALCIUM 80 MG/1
80 TABLET, FILM COATED ORAL DAILY
Qty: 90 TABLET | Refills: 3 | Status: SHIPPED | OUTPATIENT
Start: 2025-04-03

## 2025-04-03 NOTE — TELEPHONE ENCOUNTER
Called and discussed Dr. Umana's recommendation with pt. States he has been taking atorvastatin regularly as prescribed. Agreeable to dose increase. Advised to come in 2 months to have labs rechecked at his convenience and fasting. He voiced understanding.

## 2025-06-25 ENCOUNTER — LAB (OUTPATIENT)
Dept: LAB | Facility: HOSPITAL | Age: 65
End: 2025-06-25
Payer: COMMERCIAL

## 2025-06-25 DIAGNOSIS — N18.31 STAGE 3A CHRONIC KIDNEY DISEASE: Chronic | ICD-10-CM

## 2025-06-25 DIAGNOSIS — R00.2 PALPITATIONS: ICD-10-CM

## 2025-06-25 DIAGNOSIS — E78.5 DYSLIPIDEMIA: ICD-10-CM

## 2025-06-25 LAB
ALBUMIN SERPL-MCNC: 4.4 G/DL (ref 3.5–5.2)
ALBUMIN/GLOB SERPL: 1.5 G/DL
ALP SERPL-CCNC: 78 U/L (ref 39–117)
ALT SERPL W P-5'-P-CCNC: 14 U/L (ref 1–41)
ANION GAP SERPL CALCULATED.3IONS-SCNC: 10.3 MMOL/L (ref 5–15)
AST SERPL-CCNC: 14 U/L (ref 1–40)
BILIRUB SERPL-MCNC: 0.5 MG/DL (ref 0–1.2)
BUN SERPL-MCNC: 14 MG/DL (ref 8–23)
BUN/CREAT SERPL: 10 (ref 7–25)
CALCIUM SPEC-SCNC: 9.2 MG/DL (ref 8.6–10.5)
CHLORIDE SERPL-SCNC: 98 MMOL/L (ref 98–107)
CHOLEST SERPL-MCNC: 103 MG/DL (ref 0–200)
CO2 SERPL-SCNC: 28.7 MMOL/L (ref 22–29)
CREAT SERPL-MCNC: 1.4 MG/DL (ref 0.76–1.27)
EGFRCR SERPLBLD CKD-EPI 2021: 56.5 ML/MIN/1.73
GLOBULIN UR ELPH-MCNC: 3 GM/DL
GLUCOSE SERPL-MCNC: 84 MG/DL (ref 65–99)
HDLC SERPL-MCNC: 25 MG/DL (ref 40–60)
LDLC SERPL CALC-MCNC: 60 MG/DL (ref 0–100)
LDLC/HDLC SERPL: 2.39 {RATIO}
POTASSIUM SERPL-SCNC: 3.4 MMOL/L (ref 3.5–5.2)
PROT SERPL-MCNC: 7.4 G/DL (ref 6–8.5)
SODIUM SERPL-SCNC: 137 MMOL/L (ref 136–145)
TRIGL SERPL-MCNC: 91 MG/DL (ref 0–150)
VLDLC SERPL-MCNC: 18 MG/DL (ref 5–40)

## 2025-06-25 PROCEDURE — 80061 LIPID PANEL: CPT

## 2025-06-25 PROCEDURE — 80053 COMPREHEN METABOLIC PANEL: CPT

## 2025-06-25 PROCEDURE — 36415 COLL VENOUS BLD VENIPUNCTURE: CPT
